# Patient Record
Sex: MALE | Race: WHITE | Employment: OTHER | URBAN - METROPOLITAN AREA
[De-identification: names, ages, dates, MRNs, and addresses within clinical notes are randomized per-mention and may not be internally consistent; named-entity substitution may affect disease eponyms.]

---

## 2019-12-03 ENCOUNTER — HOSPITAL ENCOUNTER (OUTPATIENT)
Dept: PHYSICAL THERAPY | Age: 79
Discharge: HOME | End: 2019-12-03
Payer: MEDICARE

## 2019-12-03 PROCEDURE — 97162 PT EVAL MOD COMPLEX 30 MIN: CPT

## 2019-12-03 PROCEDURE — 97110 THERAPEUTIC EXERCISES: CPT

## 2019-12-03 NOTE — PROGRESS NOTES
[facility]  [address]  [place], [address]  [phone] fax    Plan of Care/ Statement of Necessity for Physical Therapy Services    Patient name: Zina Mccrary Start of Care: 12/3/2019   Referral source: John Russell MD : 1940    Medical Diagnosis: Low back pain [M54.5]  Payor: Sherren Dallas / Plan: [place] MEDICARE PART A & B / Product Type: Medicare /  Onset Date:11/15/19    Treatment Diagnosis: Low back pain [M54.5]   Prior Hospitalization: see medical history Provider#: [id number]   Medications: Verified on Patient summary List    Comorbidities: hx of prostate cancer, thyroid problems, alcohol use, HTN, cancer, (he is currently being tested for kidney disease)   Prior Level of Function:    GISELLA: Pt reports on 11/15/19 he was doing leg presses at the gym when his back and LLE began hurting. The pain is in two separate spots in his leg, in his R hip and his r calf. It is worse with sitting (limited to 3 mins), golfing and doing his typical gym routine (limited 50%). He reports that he did hurt his back 4-5 years ago and did PT here at [facility] which he reports was worse in comparison to how it is now. It did get better with PT back then. It is better with aspirin, he cannot use NSAIDS due to being tested for chronic kidney disease.  Heat, and light activity also help    PAIN:  Location- low back, RLE in hip and calf  Current- 2/10  Best- 2/10  Worst- 5/10  Alleviating factors:heat, light movement, aspirin  Aggravating factors: sitting, golfing, doing gym routine       OBJECTIVE:    MMT:  Hip   -flex L=4/5 R=4/5  -ext L=4/5 R=4/5  -abd L=4/5 R=4/5  Knee  -flex L=4/5 R=4/5  -ext L=5/5 R=5/5  Ankle  - DF L=4/5 R=4/5    Sensation: light touch grossly intact, pt reports he has a hx of some numbness in his L foot he reports is longstanding and due to statin medicaiton    Posture: mild lumbar flattening    Balance: SLS  R= 30s mild pain  L=20 s    AROM:  Lumbar  Flexion- fingertips to tibial tuberosity, SEVERE pain  Extension- WNL   Rotation R-  WNL mild pain at endrange  Rotation L- WNL mild pain at endrange  Sidebend R-WNL   Sidebend L-WNL   Hip  Flexion- L=50 deg R=45 deg with pain  Extension- L=5/20 deg, R=0/20 deg    Outcome Measure: FOTO= 70    Functional movement. Full squat: pt demonstrates pelvic wink, lumbar inc flexion, NEVILLE heel lift compensations for dec hip and ankle mobilty    Palpation for Tenderness: mild tenderness to palpation of R lumbar multifidus    Special Tests:  Supine to long sit- negative  SLR- positive on R  FABERs- negative  Slump Test- positive on R        The Plan of Care and following information is based on the information from the initial evaluation. Assessment/ key information:   Pt presents primarily with signs and symptoms consistent with dx including limited ROM, dec strength, pain, and dec postural dynamic stability which are impairing their ability to functionally sit, golf, and perform typical gym routine. Skilled PT is indicated to address said deficits to return Pt to PLOF safely. Pt progress may be slowed due to Pt age, chronicity of condition, degenerative joint changes, and Pt comorbidities. Physical therapy treatments may include, but are not limited to manual therapy, strengthening, stretching, HEP training, neuromuscular reeducation, balance training, modalities as indicated, postural training, self care training, and functional mobility training. If Pt is not seen within 30 days, Pt will be DC from skilled PT and remain under care of MD.     Evaluation Complexity History HIGH Complexity :3+ comorbidities / personal factors will impact the outcome/ POC ; Examination MEDIUM Complexity : 3 Standardized tests and measures addressing body structure, function, activity limitation and / or participation in recreation  ;Presentation MEDIUM Complexity : Evolving with changing characteristics  ; Clinical Decision Making MEDIUM Complexity : FOTO score of 26-74  Overall Complexity Rating: MEDIUM  Problem List: pain affecting function, decrease ROM, decrease strength, impaired gait/ balance, decrease ADL/ functional abilitiies, decrease activity tolerance, decrease flexibility/ joint mobility and decrease transfer abilities   Treatment Plan may include any combination of the following: Therapeutic exercise, Therapeutic activities, Neuromuscular re-education, Physical agent/modality, Gait/balance training, Manual therapy, Patient education, Self Care training, Functional mobility training, Home safety training and Stair training  Patient / Family readiness to learn indicated by: asking questions  Persons(s) to be included in education: patient (P)  Barriers to Learning/Limitations: yes;  sensory deficits-vision/hearing/speech  Patient Goal (s): flexibility, no pain  Patient Self Reported Health Status: good  Rehabilitation Potential: good    Short Term Goals: To be accomplished in 1 weeks:  1) Goal: Patient will be independent and compliant with HEP in order to progress toward long term goals. Status at last note/certification: issued and reviewed  Long Term Goals: To be accomplished in 10 treatments:  1) Goal: Patient will improve FOTO assessment score to 82 pts in order to indicate improved functional abilities. Status at last note/certification: 70 pts  2) Goal: Patient will improve NEVILLE hip extension to 20 deg for improved posture and gait mechanics  Status at last note/certification:L=5/20, R 0 /20 deg  3) Goal: Patient will report worst back pain as 4/10 or less in order to progress toward personal goals. Status at last note/certification: 79/01  4) Goal: Patient will report overall at least 85% improvement in function in order to progress toward premorbid status.   Status at last note/certification: n/a  5) Goal: Patient will report ability to golf and do gym routine at 100% ability for general health  Status at last note/certification: Pt reports ability to perform 50% his typical activities      Frequency / Duration: Patient to be seen 2 times per week for 5 weeks. Certification Period: 12/3/19-1/27/19  Patient/ Caregiver education and instruction: Diagnosis, prognosis, exercises   [x]  Plan of care has been reviewed with ALIS Owens 12/3/2019 8:12 AM    ________________________________________________________________________    I certify that the above Therapy Services are being furnished while the patient is under my care. I agree with the treatment plan and certify that this therapy is necessary.     Physician's Signature:____________Date:_________TIME:________    ** Signature, Date and Time must be completed for valid certification **    Please sign and return to [facility] [address]  [address]  [place], [address]  [phone] fax

## 2019-12-03 NOTE — PROGRESS NOTES
PT DAILY TREATMENT NOTE 10-18    Patient Name: Cari Salvador  Date:12/3/2019  : 1940  [x]  Patient  Verified  Payor: VA MEDICARE / Plan: VA MEDICARE PART A & B / Product Type: Medicare /    In time:830  Out time:925  Total Treatment Time (min): 55  Visit #: 1 of 10    Medicare/BCBS Only   Total Timed Codes (min):  15 1:1 Treatment Time:  60       Treatment Area: Low back pain [M54.5]    SUBJECTIVE  Pain Level (0-10 scale): 2  Any medication changes, allergies to medications, adverse drug reactions, diagnosis change, or new procedure performed?: [x] No    [] Yes (see summary sheet for update)  Subjective functional status/changes:   [] No changes reported  Pt initial eval today see note for details    OBJECTIVE      40 min [x]Eval                  []Re-Eval       15 min Therapeutic Exercise:  [] See flow sheet :   Rationale: increase ROM and increase strength to improve the patients ability to return to recreational activities              With   [] TE   [] TA   [] neuro   [] other: Patient Education: [x] Review HEP    [] Progressed/Changed HEP based on:   [] positioning   [] body mechanics   [] transfers   [] heat/ice application    [] other:      Other Objective/Functional Measures: Justification for Eval Code Complexity:  Patient History : see POC   Examination see exam   Clinical Presentation: evolving  Clinical Decision Making : FOTO : 70/100       Pain Level (0-10 scale) post treatment: 2    ASSESSMENT/Changes in Function: Pt was instructed in initial HEP required demo, vc, and tc for all exercises to perform correctly. Pt was given hand out detailing exercises and instructed in modification of exercises to tolerance, and in performing exercises safely. Pt verbalized understanding.      Patient will continue to benefit from skilled PT services to modify and progress therapeutic interventions, address functional mobility deficits, address ROM deficits, address strength deficits, analyze and address soft tissue restrictions, analyze and cue movement patterns, analyze and modify body mechanics/ergonomics, assess and modify postural abnormalities, address imbalance/dizziness and instruct in home and community integration to attain remaining goals.      [x]  See Plan of Care  []  See progress note/recertification  []  See Discharge Summary         Progress towards goals / Updated goals:  No progress as goals were set today    PLAN  []  Upgrade activities as tolerated     []  Continue plan of care  []  Update interventions per flow sheet       []  Discharge due to:_  []  Other:_      Carito Bradford 12/3/2019  8:15 AM    Future Appointments   Date Time Provider Juarez Loco   12/3/2019  8:30 AM Nolberto Carvalho [facility]

## 2019-12-05 ENCOUNTER — HOSPITAL ENCOUNTER (OUTPATIENT)
Dept: PHYSICAL THERAPY | Age: 79
Discharge: HOME | End: 2019-12-05
Payer: MEDICARE

## 2019-12-05 PROCEDURE — 97140 MANUAL THERAPY 1/> REGIONS: CPT

## 2019-12-05 PROCEDURE — 97110 THERAPEUTIC EXERCISES: CPT

## 2019-12-05 NOTE — PROGRESS NOTES
PT DAILY TREATMENT NOTE 10-18    Patient Name: Rebecca Samuels  Date:2019  : 1940  [x]  Patient  Verified  Payor: VA MEDICARE / Plan: VA MEDICARE PART A & B / Product Type: Medicare /    In time:900  Out time:930  Total Treatment Time (min): 30   Visit #: 2 of 10    Medicare/BCBS Only   Total Timed Codes (min):  23 1:1 Treatment Time:  23       Treatment Area: Low back pain [M54.5]    SUBJECTIVE  Pain Level (0-10 scale): 2  Any medication changes, allergies to medications, adverse drug reactions, diagnosis change, or new procedure performed?: [x] No    [] Yes (see summary sheet for update)  Subjective functional status/changes:   [] No changes reported  Pt reports he has been doing his stretches at home and they are going well. OBJECTIVE      15 min Therapeutic Exercise:  [x] See flow sheet :   Rationale: increase ROM and increase strength to improve the patients ability to participate in recreational activities    8 min Manual Therapy:  Trigger point release to R TFL   Rationale: increase tissue extensibility to improve ROM for recreational activities. With   [] TE   [] TA   [] neuro   [] other: Patient Education: [x] Review HEP    [] Progressed/Changed HEP based on:   [] positioning   [] body mechanics   [] transfers   [] heat/ice application    [] other:      Other Objective/Functional Measures: initiated therex per flow sheet     Pain Level (0-10 scale) post treatment: 2    ASSESSMENT/Changes in Function: Pt required vc and demo for all new exercises to perform correctly. Pt was challenged with paloff presses, PT plans to add in proprioception training NV to improve Pt balance and postural awareness with either eyes closed on foam, or eyes open foam chops to improve core stability and balance. Pt had no inc in sx with exercise or manual tx today. Pt edu re ankle mobility and proprioception having an influence on balance. Pt verbalized understanding.  Pt had 23 mins 1:1 time 8 mins manual therapy, 15 mins therex with PT, 7 mins with tech for 22 mins total    Patient will continue to benefit from skilled PT services to modify and progress therapeutic interventions, address functional mobility deficits, address ROM deficits, address strength deficits, analyze and address soft tissue restrictions, analyze and cue movement patterns, analyze and modify body mechanics/ergonomics, assess and modify postural abnormalities, address imbalance/dizziness and instruct in home and community integration to attain remaining goals. []  See Plan of Care  []  See progress note/recertification  []  See Discharge Summary         Progress towards goals / Updated goals:  Short Term Goals: To be accomplished in 1 weeks:  1) Goal: Patient will be independent and compliant with HEP in order to progress toward long term goals. Status at last note/certification: issued and reviewed  [facility] [address] be accomplished in 10 treatments:  1) Goal: Patient will improve FOTO assessment score to 82 pts in order to indicate improved functional abilities. Status at last note/certification: 70 pts  2) Goal: Patient will improve NEVILLE hip extension to 20 deg for improved posture and gait mechanics  Status at last note/certification:L=5/20, R 0 /20 deg  3) Goal: Patient will report worst back pain as 4/10 or less in order to progress toward personal goals. Status at last note/certification: 29/08  4) Goal: Patient will report overall at least 85% improvement in function in order to progress toward premorbid status.   Status at last note/certification: n/a  5) Goal: Patient will report ability to golf and do gym routine at 100% ability for general health  Status at last note/certification: Pt reports ability to perform 50% his typical activities       PLAN  [x]  Upgrade activities as tolerated     []  Continue plan of care  []  Update interventions per flow sheet       []  Discharge due to:_  []  Other:_      Aby Campos Velo 12/5/2019  10:25 AM    Future Appointments   Date Time Provider Juarez Loco   12/10/2019  9:00 AM Sharon Brush, PTA [facility]   12/12/2019  9:30 AM Sharon Brush, PTA [facility]   12/17/2019  9:30 AM Sharon Brush, PTA [facility]   12/19/2019  9:00 AM Sharon Brush, PTA [facility]   12/23/2019  9:30 AM Lyla Whittington, PTA [facility]   12/26/2019  9:00 AM Cem Staples, PT [facility]   12/31/2019  9:00 AM Sharon Brush, PTA [facility]   1/2/2020  9:00 AM Cem Staples, PT [facility]

## 2019-12-10 ENCOUNTER — HOSPITAL ENCOUNTER (OUTPATIENT)
Dept: PHYSICAL THERAPY | Age: 79
Discharge: HOME | End: 2019-12-10
Payer: MEDICARE

## 2019-12-10 PROCEDURE — 97110 THERAPEUTIC EXERCISES: CPT

## 2019-12-10 PROCEDURE — 97140 MANUAL THERAPY 1/> REGIONS: CPT

## 2019-12-10 NOTE — PROGRESS NOTES
PT DAILY TREATMENT NOTE 10-18    Patient Name: Oralia Calderon  Date:12/10/2019  : 1940  [x]  Patient  Verified  Payor: VA MEDICARE / Plan: VA MEDICARE PART A & B / Product Type: Medicare /    In time:9:00  Out time:9:39  Total Treatment Time (min): 39  Visit #: 3 of 10    Medicare/BCBS Only   Total Timed Codes (min):  39 1:1 Treatment Time:  31       Treatment Area: Low back pain [M54.5]    SUBJECTIVE  Pain Level (0-10 scale): 3/10  Any medication changes, allergies to medications, adverse drug reactions, diagnosis change, or new procedure performed?: [x] No    [] Yes (see summary sheet for update)  Subjective functional status/changes:   [] No changes reported  \"It's really bad today, I can't sit long at all, it was a 7/10 driving here this morning. \"    OBJECTIVE    31 min Therapeutic Exercise:  [x] See flow sheet :   Rationale: increase ROM, increase strength and increase proprioception to improve the patients ability to perform ADL's.    8 min Manual Therapy:  DTM/TPR right QL, L/S paraspinals, piriformis. Pt prone. Rationale: decrease pain, increase ROM, increase tissue extensibility and decrease trigger points to improve ease of performing functional activities. With   [x] TE   [] TA   [] neuro   [] other: Patient Education: [x] Review HEP    [] Progressed/Changed HEP based on:   [] positioning   [] body mechanics   [] transfers   [] heat/ice application    [] other:      Other Objective/Functional Measures: Able to perform standing trunk extension 10x3\", pt stated \"this feels good. \" Added active HS stretch (B) and piriformis stretch (B) per flow sheet. Pain Level (0-10 scale) post treatment: 2-3/10    ASSESSMENT/Changes in Function: Significant restrictions in Right piriformis. Pt expressed Right calf pain increases while in seated position. Slight decrease in pain level post-treatment.  Continue PT to increase flexibility, decrease mm restrictions, improve posture to improve ease of performing functional activities. Patient will continue to benefit from skilled PT services to modify and progress therapeutic interventions, address functional mobility deficits, address ROM deficits, address strength deficits, analyze and address soft tissue restrictions and analyze and modify body mechanics/ergonomics to attain remaining goals. [x]  See Plan of Care  []  See progress note/recertification  []  See Discharge Summary         Progress towards goals / Updated goals:  Short Term Goals: To be accomplished in 1 weeks:  1) Goal: Patient will be independent and compliant with HEP in order to progress toward long term goals. Status at last note/certification: issued and reviewed  Current: Met, pt reports HEP compliance. 12/10/2019  Long Term Goals: To be accomplished in 10 treatments:  1) Goal: Patient will improve FOTO assessment score to 82 pts in order to indicate improved functional abilities. Status at last note/certification: 37 XOE  2) Goal: Patient will improve NEVILLE hip extension to 20 deg for improved posture and gait mechanics  Status at last note/certification:L=5/20, R 0 /20 deg  3) Goal: Patient will report worst back pain as 4/10 or less in order to progress toward personal goals. Status at last note/certification: 44/65  4) Goal: Patient will report overall at least 85% improvement in function in order to progress toward premorbid status.   Status at last note/certification: n/a  5) Goal: Patient will report ability to golf and do gym routine at 100% ability for general health  Status at last note/certification: Pt reports ability to perform 50% his typical activities    PLAN  []  Upgrade activities as tolerated     [x]  Continue plan of care  []  Update interventions per flow sheet       []  Discharge due to:_  []  Other:_      Gutierrez Fung PTA 12/10/2019  8:35 AM    Future Appointments   Date Time Provider Juarez Loco   12/10/2019  9:00 AM Carlene Linares PTA [facility]   12/12/2019  9:30 AM Jonatan Harvel, PTA [facility]   12/17/2019  9:30 AM Jonatan Harvel, PTA [facility]   12/19/2019  9:00 AM Jonatan Harvel, PTA [facility]   12/23/2019  9:30 AM Alison Lainez, PTA [facility]   12/26/2019  9:00 AM Vernal Ket, PT [facility]   12/31/2019  9:00 AM Jonatan Harvel, PTA [facility]   1/2/2020  9:00 AM Verbarron Ket, PT [facility]

## 2019-12-12 ENCOUNTER — HOSPITAL ENCOUNTER (OUTPATIENT)
Dept: PHYSICAL THERAPY | Age: 79
Discharge: HOME | End: 2019-12-12
Payer: MEDICARE

## 2019-12-12 PROCEDURE — 97110 THERAPEUTIC EXERCISES: CPT

## 2019-12-12 PROCEDURE — 97140 MANUAL THERAPY 1/> REGIONS: CPT

## 2019-12-12 NOTE — PROGRESS NOTES
PT DAILY TREATMENT NOTE 10-18    Patient Name: Chas Ritter  Date:2019  : 1940  [x]  Patient  Verified  Payor: Nancy Dorman / Plan: VA MEDICARE PART A & B / Product Type: Medicare /    In IOXA:6682  Out time:1005  Total Treatment Time (min): 47  Visit #: 4 of 10    Medicare/BCBS Only   Total Timed Codes (min):  47 1:1 Treatment Time:  42       Treatment Area: Low back pain [M54.5]    SUBJECTIVE  Pain Level (0-10 scale): 3  Any medication changes, allergies to medications, adverse drug reactions, diagnosis change, or new procedure performed?: [x] No    [] Yes (see summary sheet for update)  Subjective functional status/changes:   [] No changes reported  Pt reports he is most comfortable with supine position. OBJECTIVE    Modality rationale:    Min Type Additional Details    [] Estim:  []Unatt       []IFC  []Premod                        []Other:  []w/ice   []w/heat  Position:  Location:    [] Estim: []Att    []TENS instruct  []NMES                    []Other:  []w/US   []w/ice   []w/heat  Position:  Location:    []  Traction: [] Cervical       []Lumbar                       [] Prone          []Supine                       []Intermittent   []Continuous Lbs:  [] before manual  [] after manual    []  Ultrasound: []Continuous   [] Pulsed                           []1MHz   []3MHz W/cm2:  Location:    []  Iontophoresis with dexamethasone         Location: [] Take home patch   [] In clinic    []  Ice     []  heat  []  Ice massage  []  Laser   []  Anodyne Position:  Location:    []  Laser with stim  []  Other:  Position:  Location:    []  Vasopneumatic Device Pressure:       [] lo [] med [] hi   Temperature: [] lo [] med [] hi   [] Skin assessment post-treatment:  []intact []redness- no adverse reaction    []redness  adverse reaction:     39 min Therapeutic Exercise:  [x] See flow sheet :   Rationale: increase ROM and increase strength to improve the patients ability to perform functional tasks.      8 min Manual Therapy:  STM right  glutes, piriformis, TrP release glute max and piriformis   Rationale: decrease pain, increase ROM and increase tissue extensibility to improve abl            With   [] TE   [] TA   [] neuro   [] other: Patient Education: [x] Review HEP    [] Progressed/Changed HEP based on:   [] positioning   [] body mechanics   [] transfers   [] heat/ice application    [] other:      Other Objective/Functional Measures: Added right QL stretch in sidelying     Pain Level (0-10 scale) post treatment: 2    ASSESSMENT/Changes in Function: pt reported improved tolerance to sitting and diminished pain following treatment. He also reported no c/o gastroc region pain post treatment. Patient will continue to benefit from skilled PT services to modify and progress therapeutic interventions, address functional mobility deficits, address ROM deficits, address strength deficits, analyze and address soft tissue restrictions, analyze and cue movement patterns, analyze and modify body mechanics/ergonomics and assess and modify postural abnormalities to attain remaining goals. []  See Plan of Care  []  See progress note/recertification  []  See Discharge Summary         Progress towards goals / Updated goals:  Short Term Goals: To be accomplished in 1 weeks:  1) Goal: Patient will be independent and compliant with HEP in order to progress toward long term goals. Status at last note/certification: issued and reviewed  Current: Met, pt reports HEP compliance. 12/10/2019  Long Term Goals: To be accomplished in 10 treatments:  1) Goal: Patient will improve FOTO assessment score to 82 pts in order to indicate improved functional abilities.   Status at last note/certification: 72   2) Goal: Patient will improve NEVILLE hip extension to 20 deg for improved posture and gait mechanics  Status at last note/certification:L=5/20, R 0 /20 deg  Current: improving as noted noted with 1/2 prone hip extension 12-12-19  3) Goal: Patient will report worst back pain as 4/10 or less in order to progress toward personal goals. Status at last note/certification: 88/56  Current: improving per pt report on 12-12-19  4) Goal: Patient will report overall at least 85% improvement in function in order to progress toward premorbid status.   Status at last note/certification: n/a  5) Goal: Patient will report ability to golf and do gym routine at 100% ability for general health  Status at last note/certification: Pt reports ability to perform 50% his typical activities       PLAN  []  Upgrade activities as tolerated     [x]  Continue plan of care  []  Update interventions per flow sheet       []  Discharge due to:_  []  Other:_      Ivanna Natarajan, PT 12/12/2019  9:22 AM    Future Appointments   Date Time Provider Juarez Loco   12/12/2019  9:30 AM Srinivas Quezada, PT [facility]   12/17/2019  9:30 AM Twin Bynum, PTA [facility]   12/19/2019  9:00 AM Twin Bynum, PTA [facility]   12/23/2019  9:30 AM Bharathi Del Valle PTA [facility]   12/26/2019  9:00 AM Srinivas Quezada, PT [facility]   12/31/2019  9:00 AM Twin Bynum, PTA [facility]   1/2/2020  9:00 AM Srinivas Quezada, PT [facility]

## 2019-12-17 ENCOUNTER — HOSPITAL ENCOUNTER (OUTPATIENT)
Dept: PHYSICAL THERAPY | Age: 79
Discharge: HOME | End: 2019-12-17
Payer: MEDICARE

## 2019-12-17 PROCEDURE — 97110 THERAPEUTIC EXERCISES: CPT

## 2019-12-17 PROCEDURE — 97140 MANUAL THERAPY 1/> REGIONS: CPT

## 2019-12-17 NOTE — PROGRESS NOTES
PT DAILY TREATMENT NOTE 10-18    Patient Name: Gui Valdez  Date:2019  : 1940  [x]  Patient  Verified  Payor: [place] MEDICARE / Plan: [place] MEDICARE PART A & B / Product Type: Medicare /    In time:9:27  Out time:10:06  Total Treatment Time (min): 39  Visit #: 5 of 10    Medicare/BCBS Only   Total Timed Codes (min):  39 1:1 Treatment Time:  39       Treatment Area: Low back pain [M54.5]    SUBJECTIVE  Pain Level (0-10 scale): 2/10  Any medication changes, allergies to medications, adverse drug reactions, diagnosis change, or new procedure performed?: [x] No    [] Yes (see summary sheet for update)  Subjective functional status/changes:   [] No changes reported  \"Having a little pain in the right calf. \"    OBJECTIVE    31 min Therapeutic Exercise:  [x] See flow sheet :   Rationale: increase ROM, increase strength and increase proprioception to improve the patients ability to perform ADL's.     8 min Manual Therapy:  DTM/TPR Right QL, L/S paraspinals, piriformis and gluteals. Pt prone. Rationale: decrease pain, increase ROM, increase tissue extensibility and decrease trigger points to improve ease of performing functional and recreational activities. With   [x] TE   [] TA   [] neuro   [] other: Patient Education: [x] Review HEP    [] Progressed/Changed HEP based on:   [] positioning   [] body mechanics   [] transfers   [] heat/ice application    [] other:      Other Objective/Functional Measures: Added stationary bike as warm up, level 4 resistance. Pain Level (0-10 scale) post treatment: 1/10    ASSESSMENT/Changes in Function: Pt reports improvement with performing ADL's, \"driving is a lot easier. \" Pt fully participated in treatment, reported a slight decrease in radicular symptoms post-treatment. Continue PT to increase mobility/flexibility and increase core stab to improve ease of performing functional activities.     Patient will continue to benefit from skilled PT services to modify and progress therapeutic interventions, address functional mobility deficits, address ROM deficits, address strength deficits, analyze and address soft tissue restrictions and analyze and modify body mechanics/ergonomics to attain remaining goals. [x]  See Plan of Care  []  See progress note/recertification  []  See Discharge Summary         Progress towards goals / Updated goals:  Short Term Goals: To be accomplished in 1 weeks:  1) Goal: Patient will be independent and compliant with HEP in order to progress toward long term goals. Status at last note/certification: issued and reviewed  Current: Met, pt reports HEP compliance. 12/10/2019  Long Term Goals: To be accomplished in 10 treatments:  1) Goal: Patient will improve FOTO assessment score to 82 pts in order to indicate improved functional abilities. Status at last note/certification: 87 ZQV  2) Goal: Patient will improve NEVILLE hip extension to 20 deg for improved posture and gait mechanics  Status at last note/certification:L=5/20, R 0 /20 deg  Current: improving as noted noted with 1/2 prone hip extension 12-12-19  3) Goal: Patient will report worst back pain as 4/10 or less in order to progress toward personal goals. Status at last note/certification: 79/51  Current: improving per pt report on 12-12-19  4) Goal: Patient will report overall at least 85% improvement in function in order to progress toward premorbid status. Status at last note/certification: n/a  Current: Progressing, pt reports improvement with performing ADL's, \"driving is a lot easier. \" 12/17/2019  5) Goal: Patient will report ability to golf and do gym routine at 100% ability for general health  Status at last note/certification: Pt reports ability to perform 50% his typical activities    PLAN  []  Upgrade activities as tolerated     [x]  Continue plan of care   []  Update interventions per flow sheet       []  Discharge due to:_  []  Other:_      Carolina Browne PTA 12/17/2019  9:07 AM    Future Appointments   Date Time Provider Juarez Beronica   12/17/2019  9:30 AM Jada Wolf, PTA [facility]   12/19/2019  9:00 AM Jada Wolf, PTA [facility]   12/23/2019  9:30 AM Felipe Coe, PTA [facility]   12/26/2019  9:00 AM Delicia Pepe, PT [facility]   12/31/2019  9:00 AM Jada Wolf, PTA [facility]   1/2/2020  9:00 AM Delicia Pepe, PT [facility]

## 2019-12-19 ENCOUNTER — HOSPITAL ENCOUNTER (OUTPATIENT)
Dept: PHYSICAL THERAPY | Age: 79
Discharge: HOME | End: 2019-12-19
Payer: MEDICARE

## 2019-12-19 PROCEDURE — 97112 NEUROMUSCULAR REEDUCATION: CPT

## 2019-12-19 PROCEDURE — 97140 MANUAL THERAPY 1/> REGIONS: CPT

## 2019-12-19 PROCEDURE — 97110 THERAPEUTIC EXERCISES: CPT

## 2019-12-19 NOTE — PROGRESS NOTES
PT DAILY TREATMENT NOTE 10-18    Patient Name: Alycia Guerra  Date:2019  : 1940  [x]  Patient  Verified  Payor: [place] MEDICARE / Plan: [place] MEDICARE PART A & B / Product Type: Medicare /    In time:9:00  Out time:9:57  Total Treatment Time (min): 57  Visit #: 6 of 10    Medicare/BCBS Only   Total Timed Codes (min):  57 1:1 Treatment Time:  48       Treatment Area: Low back pain [M54.5]    SUBJECTIVE  Pain Level (0-10 scale): 0/10  Any medication changes, allergies to medications, adverse drug reactions, diagnosis change, or new procedure performed?: [x] No    [] Yes (see summary sheet for update)  Subjective functional status/changes:   [] No changes reported  \"No pain at the moment. \"    OBJECTIVE    39 min Therapeutic Exercise:  [x] See flow sheet :   Rationale: increase ROM and increase strength to improve the patients ability to perform ADL's. 10 min Neuromuscular Re-education:  [x]  See flow sheet : Core align, ness series for posture and core work. Rationale: increase strength and increase proprioception  to improve the patients ability to perform functional activities. 8 min Manual Therapy:  DTM/TPR Right QL, L/S paraspinals, piriformis and gluteals. Pt prone. Rationale: decrease pain, increase ROM, increase tissue extensibility and decrease trigger points to improve ease of performing functional activities. With   [x] TE   [] TA   [] neuro   [] other: Patient Education: [x] Review HEP    [] Progressed/Changed HEP based on:   [] positioning   [] body mechanics   [] transfers   [] heat/ice application    [] other:      Other Objective/Functional Measures: Added ness chops, coreAlign statue series. Pain Level (0-10 scale) post treatment: 0/10    ASSESSMENT/Changes in Function: Challenged with coreAlign exercises. Expressed mm fatigue and soreness, denied pain. Continue PT to increase core and (B) hip strength to improve ease of performing functional tasks.     Patient will continue to benefit from skilled PT services to modify and progress therapeutic interventions, address functional mobility deficits, address ROM deficits, address strength deficits, analyze and address soft tissue restrictions and analyze and modify body mechanics/ergonomics to attain remaining goals. [x]  See Plan of Care  []  See progress note/recertification  []  See Discharge Summary         Progress towards goals / Updated goals:  Short Term Goals: To be accomplished in 1 weeks:  1) Goal: Patient will be independent and compliant with HEP in order to progress toward long term goals. Status at last note/certification: issued and reviewed  Current: Met, pt reports HEP compliance. 12/10/2019  Long Term Goals: To be accomplished in 10 treatments:  1) Goal: Patient will improve FOTO assessment score to 82 pts in order to indicate improved functional abilities. Status at last note/certification: 97 CMY  2) Goal: Patient will improve NEVILLE hip extension to 20 deg for improved posture and gait mechanics  Status at last note/certification:L=5/20, R 0 /20 deg  Current: improving as noted noted with 1/2 prone hip extension 12-12-19  3) Goal: Patient will report worst back pain as 4/10 or less in order to progress toward personal goals. Status at last note/certification: 84/09  Current: improving per pt report on 12-12-19  4) Goal: Patient will report overall at least 85% improvement in function in order to progress toward premorbid status. Status at last note/certification: n/a  Current: Progressing, pt reports improvement with performing ADL's, \"driving is a lot easier. \" 12/17/2019  5) Goal: Patient will report ability to golf and do gym routine at 100% ability for general health  Status at last note/certification: Pt reports ability to perform 50% his typical activities    PLAN  []  Upgrade activities as tolerated     [x]  Continue plan of care  []  Update interventions per flow sheet       []  Discharge due to:_  []  Other:_      Carolina Browne, ALIS 12/19/2019  8:47 AM    Future Appointments   Date Time Provider Juarez Loco   12/19/2019  9:00 AM [facility]   12/23/2019  9:30 AM Wendy Fisher PTA [facility]   12/26/2019  9:00 AM [facility]   12/31/2019  9:00 AM Jennifer Mcclelland PTA [facility]   1/2/2020  9:00 AM Santa Moreno, PT [facility]

## 2019-12-23 ENCOUNTER — HOSPITAL ENCOUNTER (OUTPATIENT)
Dept: PHYSICAL THERAPY | Age: 79
Discharge: HOME | End: 2019-12-23
Payer: MEDICARE

## 2019-12-23 PROCEDURE — 97140 MANUAL THERAPY 1/> REGIONS: CPT

## 2019-12-23 PROCEDURE — 97110 THERAPEUTIC EXERCISES: CPT

## 2019-12-23 PROCEDURE — 97112 NEUROMUSCULAR REEDUCATION: CPT

## 2019-12-23 NOTE — PROGRESS NOTES
PT DAILY TREATMENT NOTE 10-18    Patient Name: Adeola Wagner  Date:2019  : 1940  [x]  Patient  Verified  Payor: [place] MEDICARE / Plan: [place] MEDICARE PART A & B / Product Type: Medicare /    In time:9:23  Out time:9:07  Total Treatment Time (min): 44  Visit #: 7 of 10    Medicare/BCBS Only   Total Timed Codes (min):  44 1:1 Treatment Time:  44       Treatment Area: Low back pain [M54.5]    SUBJECTIVE  Pain Level (0-10 scale): 1  Any medication changes, allergies to medications, adverse drug reactions, diagnosis change, or new procedure performed?: [x] No    [] Yes (see summary sheet for update)  Subjective functional status/changes:   [] No changes reported  Pt reports pain has started going into left lower leg, not just the right    OBJECTIVE    26 min Therapeutic Exercise:  [x] See flow sheet :   Rationale: increase ROM and increase strength to improve the patients ability to perform ADLs    10 min Neuromuscular Re-education:  [x]  See flow sheet :   Rationale: increase strength, improve balance and increase proprioception  to improve the patients ability to perform functional tasks    8 min Manual Therapy:   DTM/TPR Right QL, L/S paraspinals, piriformis and gluteals. Pt prone. Rationale: decrease pain, increase ROM and increase tissue extensibility to improve functional mobility          With   [] TE   [] TA   [] neuro   [] other: Patient Education: [x] Review HEP    [] Progressed/Changed HEP based on:   [] positioning   [] body mechanics   [] transfers   [] heat/ice application    [] other:      Other Objective/Functional Measures:   Performed Q/L str @ stairs   Added Ludivina SLS with 10# pull down    Pain Level (0-10 scale) post treatment: 0    ASSESSMENT/Changes in Function: Pt challenged well with SLS pull down, vc's for ta activation. Reports incr'd ease with HS, Piri, and hip flexor stretches. Also reports decr'd TTP with DTM to L/s paraspinals and piri. No pain following treatment today. Patient will continue to benefit from skilled PT services to modify and progress therapeutic interventions, address functional mobility deficits, address ROM deficits, address strength deficits, analyze and address soft tissue restrictions, analyze and cue movement patterns, analyze and modify body mechanics/ergonomics and assess and modify postural abnormalities to attain remaining goals. []  See Plan of Care  []  See progress note/recertification  []  See Discharge Summary         Progress towards goals / Updated goals:  Short Term Goals: To be accomplished in 1 weeks:  1) Goal: Patient will be independent and compliant with HEP in order to progress toward long term goals. Status at last note/certification: issued and reviewed  Current: Met, pt reports HEP compliance. 12/10/2019  Long Term Goals: To be accomplished in 10 treatments:  1) Goal: Patient will improve FOTO assessment score to 82 pts in order to indicate improved functional abilities. Status at last note/certification: 14 GXG  2) Goal: Patient will improve NEVILLE hip extension to 20 deg for improved posture and gait mechanics  Status at last note/certification:L=5/20, R 0 /20 deg  Current: improving as noted noted with 1/2 prone hip extension 12-12-19  3) Goal: Patient will report worst back pain as 4/10 or less in order to progress toward personal goals. Status at last note/certification: 91/43  Current: improving per pt report on 12-12-19  4) Goal: Patient will report overall at least 85% improvement in function in order to progress toward premorbid status. Status at last note/certification: n/a  Current: Progressing, pt reports improvement with performing ADL's, \"driving is a lot easier. \" 12/17/2019  5) Goal: Patient will report ability to golf and do gym routine at 100% ability for general health  Status at last note/certification: Pt reports ability to perform 50% his typical activities       PLAN  []  Upgrade activities as tolerated [x]  Continue plan of care  []  Update interventions per flow sheet       []  Discharge due to:_  []  Other:_      Sheyla Fisher PTA 12/23/2019  9:25 AM    Future Appointments   Date Time Provider Juarez Loco   12/23/2019  9:30 AM [facility]   12/26/2019  9:00 AM Santa Moreno, PT [facility]   12/31/2019  9:00 AM [facility]   1/2/2020  9:00 AM [facility]

## 2019-12-26 ENCOUNTER — HOSPITAL ENCOUNTER (OUTPATIENT)
Dept: PHYSICAL THERAPY | Age: 79
Discharge: HOME | End: 2019-12-26
Payer: MEDICARE

## 2019-12-26 PROCEDURE — 97110 THERAPEUTIC EXERCISES: CPT

## 2019-12-26 PROCEDURE — 97140 MANUAL THERAPY 1/> REGIONS: CPT

## 2019-12-26 NOTE — PROGRESS NOTES
PT DAILY TREATMENT NOTE 10-18    Patient Name: Yves Monet  Date:2019  : 1940  [x]  Patient  Verified  Payor: [place] MEDICARE / Plan: [place] MEDICARE PART A & B / Product Type: Medicare /    In time:0900  Out time:942  Total Treatment Time (min): 42  Visit #: 7 of 10    Medicare/BCBS Only   Total Timed Codes (min):  42 1:1 Treatment Time:  25       Treatment Area: Low back pain [M54.5]    SUBJECTIVE  Pain Level (0-10 scale): 1  Any medication changes, allergies to medications, adverse drug reactions, diagnosis change, or new procedure performed?: [x] No    [] Yes (see summary sheet for update)  Subjective functional status/changes:   [] No changes reported  Pt reports doing much better since starting PT    OBJECTIVE    Modality rationale:    Min Type Additional Details    [] Estim:  []Unatt       []IFC  []Premod                        []Other:  []w/ice   []w/heat  Position:  Location:    [] Estim: []Att    []TENS instruct  []NMES                    []Other:  []w/US   []w/ice   []w/heat  Position:  Location:    []  Traction: [] Cervical       []Lumbar                       [] Prone          []Supine                       []Intermittent   []Continuous Lbs:  [] before manual  [] after manual    []  Ultrasound: []Continuous   [] Pulsed                           []1MHz   []3MHz W/cm2:  Location:    []  Iontophoresis with dexamethasone         Location: [] Take home patch   [] In clinic    []  Ice     []  heat  []  Ice massage  []  Laser   []  Anodyne Position:  Location:    []  Laser with stim  []  Other:  Position:  Location:    []  Vasopneumatic Device Pressure:       [] lo [] med [] hi   Temperature: [] lo [] med [] hi   [] Skin assessment post-treatment:  []intact []redness- no adverse reaction    26 min Therapeutic Exercise:  [x]? See flow sheet :   Rationale: increase ROM and increase strength to improve the patients ability to perform ADL's.     8 min Neuromuscular Re-education:  [x]?   See flow sheet : Core align, ness series for posture and core work. Rationale: increase strength and increase proprioception  to improve the patients ability to perform functional activities.     8 min Manual Therapy:  DTM/TPR right piriformis and gluteals. Rationale: decrease pain, increase ROM, increase tissue extensibility and decrease trigger points to improve ease of performing functional activities. With   [] TE   [] TA   [] neuro   [] other: Patient Education: [x] Review HEP    [] Progressed/Changed HEP based on:   [] positioning   [] body mechanics   [] transfers   [] heat/ice application    [] other:      Other Objective/Functional Measures:  Improved myofascial restrictions     Pain Level (0-10 scale) post treatment: 0    ASSESSMENT/Changes in Function: pt is progressing well with PT. He demonstrates significant improvement with pain and function. Patient will continue to benefit from skilled PT services to modify and progress therapeutic interventions, address functional mobility deficits, address ROM deficits, address strength deficits, analyze and address soft tissue restrictions, analyze and cue movement patterns and analyze and modify body mechanics/ergonomics to attain remaining goals. []  See Plan of Care  []  See progress note/recertification  []  See Discharge Summary         Progress towards goals / Updated goals:  Short Term Goals: To be accomplished in 1 weeks:  1) Goal: Patient will be independent and compliant with HEP in order to progress toward long term goals. Status at last note/certification: issued and reviewed  Current: Met, pt reports HEP compliance. 12/10/2019    Long Term Goals: To be accomplished in 10 treatments:  1) Goal: Patient will improve FOTO assessment score to 82 pts in order to indicate improved functional abilities.   Status at last note/certification: 13 AWK  2) Goal: Patient will improve NEVILLE hip extension to 20 deg for improved posture and gait mechanics  Status at last note/certification:L=5/20, R 0 /20 deg  Current: improving as noted noted with 1/2 prone hip extension 12-12-19  3) Goal: Patient will report worst back pain as 4/10 or less in order to progress toward personal goals. Status at last note/certification: 47/88  Current: MET on 12-26-19  4) Goal: Patient will report overall at least 85% improvement in function in order to progress toward premorbid status. Status at last note/certification: n/a  Current: Progressing, pt reports improvement with performing ADL's, \"driving is a lot easier. \" 12/17/2019  5) Goal: Patient will report ability to golf and do gym routine at 100% ability for general health  Status at last note/certification: Pt reports ability to perform 50% his typical activities    PLAN  []  Upgrade activities as tolerated     [x]  Continue plan of care  []  Update interventions per flow sheet       []  Discharge due to:_  []  Other:_      Cyrus Cannon PT 12/26/2019  9:32 AM    Future Appointments   Date Time Provider Juarez Loco   12/31/2019  9:00 AM Luis Daniel Jonas, PTA [facility]   1/2/2020  9:00 [facility]

## 2019-12-31 ENCOUNTER — HOSPITAL ENCOUNTER (OUTPATIENT)
Dept: PHYSICAL THERAPY | Age: 79
Discharge: HOME | End: 2019-12-31
Payer: MEDICARE

## 2019-12-31 PROCEDURE — 97112 NEUROMUSCULAR REEDUCATION: CPT

## 2019-12-31 PROCEDURE — 97140 MANUAL THERAPY 1/> REGIONS: CPT

## 2019-12-31 PROCEDURE — 97110 THERAPEUTIC EXERCISES: CPT

## 2019-12-31 NOTE — PROGRESS NOTES
PT DAILY TREATMENT NOTE 10-18    Patient Name: Vipin Mcmillan  Date:2019  : 1940  [x]  Patient  Verified     Payor: [place] MEDICARE / Plan: [place] MEDICARE PART A & B / Product Type: Medicare /    In time:8:50  Out time:9:35  Total Treatment Time (min): 45  Visit #: 9 of 10    Medicare/BCBS Only   Total Timed Codes (min):  45 1:1 Treatment Time:  45       Treatment Area: Low back pain [M54.5]    SUBJECTIVE  Pain Level (0-10 scale): 1-2/10  Any medication changes, allergies to medications, adverse drug reactions, diagnosis change, or new procedure performed?: [x] No    [] Yes (see summary sheet for update)  Subjective functional status/changes:   [] No changes reported  \"I still have a little discomfort, an annoying level, in the right calf. \"    OBJECTIVE    27 min Therapeutic Exercise:  [x] See flow sheet :   Rationale: increase ROM and increase strength to improve the patients ability to perform ADL's. 10 min Neuromuscular Re-education:  [x]  See flow sheet : Ludivina activities, core stabilization. Rationale: increase strength and increase proprioception  to improve the patients ability to perform functional activities. 8 min Manual Therapy:  DTM/TPR Right QL, L/S paraspinals, piriformis and gluteals, with Right hip PROM IR/ER. Pt prone. Rationale: decrease pain, increase ROM, increase tissue extensibility and decrease trigger points to improve ease of performing functional activities. With   [x] TE   [] TA   [] neuro   [] other: Patient Education: [x] Review HEP    [] Progressed/Changed HEP based on:   [] positioning   [] body mechanics   [] transfers   [] heat/ice application    [] other:      Other Objective/Functional Measures:      Pain Level (0-10 scale) post treatment: 0/10    ASSESSMENT/Changes in Function: Pt reports ~80% return to usual golf/gym activities. Pt denied pain post-treatment. Demonstrated improved trunk stability while performing ludivina series.  Continue PT to further decrease mm restrictions, increase core and (B) hip strength to improve ease of performing functional activities. Patient will continue to benefit from skilled PT services to modify and progress therapeutic interventions, address functional mobility deficits, address ROM deficits, address strength deficits, analyze and address soft tissue restrictions and analyze and modify body mechanics/ergonomics to attain remaining goals. [x]  See Plan of Care  []  See progress note/recertification  []  See Discharge Summary         Progress towards goals / Updated goals:  Short Term Goals: To be accomplished in 1 weeks:  1) Goal: Patient will be independent and compliant with HEP in order to progress toward long term goals. Status at last note/certification: issued and reviewed  Current: Met, pt reports HEP compliance. 12/10/2019     Long Term Goals: To be accomplished in 10 treatments:  1) Goal: Patient will improve FOTO assessment score to 82 pts in order to indicate improved functional abilities. Status at last note/certification: 75 TPP  2) Goal: Patient will improve NEVILLE hip extension to 20 deg for improved posture and gait mechanics  Status at last note/certification:L=5/20, R 0 /20 deg  Current: improving as noted noted with 1/2 prone hip extension 12-12-19  3) Goal: Patient will report worst back pain as 4/10 or less in order to progress toward personal goals. Status at last note/certification: 71/68  Current: MET on 12-26-19  4) Goal: Patient will report overall at least 85% improvement in function in order to progress toward premorbid status. Status at last note/certification: n/a  Current: Progressing, pt reports improvement with performing ADL's, \"driving is a lot easier. \" 12/17/2019  5) Goal: Patient will report ability to golf and do gym routine at 100% ability for general health  Status at last note/certification: Pt reports ability to perform 50% his typical activities  Current: Progressing, pt reports ~80% return to usual golf/gym activities.  12/31/2019     PLAN  []  Upgrade activities as tolerated     [x]  Continue plan of care  []  Update interventions per flow sheet       []  Discharge due to:_  []  Other:_      Pavithra Yoder PTA 12/31/2019  8:47 AM    Future Appointments   Date Time Provider Juarez Loco   12/31/2019  9:00 AM [facility]   1/2/2020  9:00 AM [facility]

## 2020-01-02 ENCOUNTER — HOSPITAL ENCOUNTER (OUTPATIENT)
Dept: PHYSICAL THERAPY | Age: 80
Discharge: HOME | End: 2020-01-02
Payer: MEDICARE

## 2020-01-02 PROCEDURE — 97112 NEUROMUSCULAR REEDUCATION: CPT

## 2020-01-02 PROCEDURE — 97140 MANUAL THERAPY 1/> REGIONS: CPT

## 2020-01-02 PROCEDURE — 97110 THERAPEUTIC EXERCISES: CPT

## 2020-01-02 NOTE — PROGRESS NOTES
PT DAILY TREATMENT NOTE 10-18    Patient Name: Meliton Braxton  Date:2020  : 1940  [x]  Patient  Verified  Payor: VA MEDICARE / Plan: VA MEDICARE PART A & B / Product Type: Medicare /    In KCEN:2926  Out time:1000  Total Treatment Time (min): 67  Visit #: 10 of 10    Medicare/BCBS Only   Total Timed Codes (min):  67 1:1 Treatment Time:  55       Treatment Area: Low back pain [M54.5]    SUBJECTIVE  Pain Level (0-10 scale): 1  Any medication changes, allergies to medications, adverse drug reactions, diagnosis change, or new procedure performed?: [x] No    [] Yes (see summary sheet for update)  Subjective functional status/changes:   [] No changes reported  Pt reports much improvement with PT. He reports minimal pain and feels he is ready for discharge.      OBJECTIVE    Modality rationale:    Min Type Additional Details    [] Estim:  []Unatt       []IFC  []Premod                        []Other:  []w/ice   []w/heat  Position:  Location:    [] Estim: []Att    []TENS instruct  []NMES                    []Other:  []w/US   []w/ice   []w/heat  Position:  Location:    []  Traction: [] Cervical       []Lumbar                       [] Prone          []Supine                       []Intermittent   []Continuous Lbs:  [] before manual  [] after manual    []  Ultrasound: []Continuous   [] Pulsed                           []1MHz   []3MHz W/cm2:  Location:    []  Iontophoresis with dexamethasone         Location: [] Take home patch   [] In clinic    []  Ice     []  heat  []  Ice massage  []  Laser   []  Anodyne Position:  Location:    []  Laser with stim  []  Other:  Position:  Location:    []  Vasopneumatic Device Pressure:       [] lo [] med [] hi   Temperature: [] lo [] med [] hi   [] Skin assessment post-treatment:  []intact []redness- no adverse reaction    []redness - adverse reaction:     49 min Therapeutic Exercise:  [x] See flow sheet :   Rationale: increase ROM and increase strength to improve the patients ability to perform daily tasks    10 min Neuromuscular Re-education:  [x]? See flow sheet : [place] activities, core stabilization. Rationale: increase strength and increase proprioception  to improve the patients ability to perform functional activities.     8 min Manual Therapy:  DTM/TPR Right QL, L/S paraspinals, piriformis and gluteals, with Right hip PROM IR/ER. Pt prone. Rationale: decrease pain, increase ROM, increase tissue extensibility and decrease trigger points to improve ease of performing functional activities. With   [] TE   [] TA   [] neuro   [] other: Patient Education: [x] Review HEP    [] Progressed/Changed HEP based on:   [] positioning   [] body mechanics   [] transfers   [] heat/ice application    [] other:      Other Objective/Functional Measures:  Updated HEP with therex     Pain Level (0-10 scale) post treatment: 0    ASSESSMENT/Changes in Function: pt has made significant progress with PT. He reports 80-90% improvement and has returned to all activities. He plans to continue with HEP and with his current gym workout. []  See Plan of Care  []  See progress note/recertification  [x]  See Discharge Summary         Progress towards goals   1) Goal: Patient will improve FOTO assessment score to 82 pts in order to indicate improved functional abilities. Status at last note/certification: 55 EFA  2) Goal: Patient will improve NEVILLE hip extension to 20 deg for improved posture and gait mechanics  Status at last note/certification:L=5/20, R 0 /20 deg  Current: not met left 10 degrees, right 5 degrees  3) Goal: Patient will report worst back pain as 4/10 or less in order to progress toward personal goals. Status at last note/certification: 61/34  Current: MET on 12-26-19  4) Goal: Patient will report overall at least 85% improvement in function in order to progress toward premorbid status.   Status at last note/certification: n/a  Current: MET  5) Goal: Patient will report ability to golf and do gym routine at 100% ability for general health  Status at last note/certification: Pt reports ability to perform 50% his typical activities  Current: MET pt reports being back to all activities.      PLAN  []  Upgrade activities as tolerated     []  Continue plan of care  []  Update interventions per flow sheet       [x]  Discharge due to:_  []  Other:_      Henrietta Saravia, PT 1/2/2020  8:51 AM    Future Appointments   Date Time Provider Juarez Loco   1/2/2020  9:00 AM Catrachita Salvador, PT [facility]

## 2020-01-02 NOTE — PROGRESS NOTES
[facility]  [address] [facility] [address]  [place], [address]  [phone] fax    Physical Therapy Discharge Summary    Patient name: Vamsi Avila Start of Care: 12/3/2019   Referral source: Nahomy Marley MD : 1940                Medical Diagnosis: Low back pain [M54.5]  Payor: Clari Rand / Plan: VA MEDICARE PART A & B / Product Type: Medicare /  Onset Date:11/15/19                Treatment Diagnosis: Low back pain [M54.5]   Prior Hospitalization: see medical history Provider#: [id number]   Medications: Verified on Patient summary List    Comorbidities: hx of prostate cancer, thyroid problems, alcohol use, HTN, cancer, (he is currently being tested for kidney disease)   Prior Level of Function: functionally I with all activities, golfer    Visits from Start of Care: 10    Missed Visits: 0  Reporting Period : 12-3-19 to 20    Summary of Care:   pt has made significant progress with PT. He reports 80-90% improvement and has returned to all activities. He plans to continue with HEP and with his current gym workout. Progress towards goals   1) Goal: Patient will improve FOTO assessment score to 82 pts in order to indicate improved functional abilities. Status at last note/certification: 62 JANELLE  2) Goal: Patient will improve NEVILLE hip extension to 20 deg for improved posture and gait mechanics  Status at last note/certification:L=5/20, R 0 /20 deg  Current: not met left 10 degrees, right 5 degrees  3) Goal: Patient will report worst back pain as 4/10 or less in order to progress toward personal goals. Status at last note/certification: 46/76  Current: MET on 19  4) Goal: Patient will report overall at least 85% improvement in function in order to progress toward premorbid status.   Status at last note/certification: n/a  Current: MET  5) Goal: Patient will report ability to golf and do gym routine at 100% ability for general health  Status at last note/certification: Pt reports ability to perform 50% his typical activities  Current: MET pt reports being back to all activities.            ASSESSMENT/RECOMMENDATIONS:  [x]Discontinue therapy: [x]Patient has reached or is progressing toward set goals      []Patient is non-compliant or has abdicated      []Due to lack of appreciable progress towards set goals    Phoebe Stewart, PT 1/2/2020 11:05 AM

## 2020-04-21 ENCOUNTER — HOSPITAL ENCOUNTER (OUTPATIENT)
Dept: PHYSICAL THERAPY | Age: 80
Discharge: HOME | End: 2020-04-21
Payer: MEDICARE

## 2020-04-21 PROCEDURE — 97162 PT EVAL MOD COMPLEX 30 MIN: CPT

## 2020-04-21 PROCEDURE — 97110 THERAPEUTIC EXERCISES: CPT

## 2020-04-21 NOTE — PROGRESS NOTES
[facility]  [address]  [place], [address]  [phone] fax    Plan of Care/ Statement of Necessity for Physical Therapy Services    Patient name: Debo Quigley Start of Care: 2020   Referral source: Deonnagerber Segura DO : 1940    Medical Diagnosis: Pain in left shoulder [M25.512]  Payor: Clari Seeds / Plan: VA MEDICARE PART A & B / Product Type: Medicare /  Onset Date: 2020    Treatment Diagnosis: left shoulder pain    Prior Hospitalization: see medical history Provider#: [id number]   Medications: Verified on Patient summary List    Comorbidities: cancer, LBP, heart disease, HTN   Prior Level of Function: functionally I with daily tasks      The Plan of Care and following information is based on the information from the initial evaluation. Assessment/ key information: 69 y/o male presents with c/o left shoulder pain that started earlier this month. He reports no specific injury but started to notice it more with working out with weights. Currently the pt reports constant pain and limited ability for performing daily activities. The pt demonstrates limited AROM and PROM of the left shoulder with c/o pain in all planes of motion. MMT reveals weakness throughout the left shoulder complex. Left shoulder with + tenderness noted over the infraspinatus and supraspinatus. Pt will benefit from PT to address the aforementioned impairments. Due to covid-19 he will be performing virtual sessions at this time. Evaluation Complexity History MEDIUM  Complexity : 1-2 comorbidities / personal factors will impact the outcome/ POC ; Examination MEDIUM Complexity : 3 Standardized tests and measures addressing body structure, function, activity limitation and / or participation in recreation  ;Presentation MEDIUM Complexity : Evolving with changing characteristics  ; Clinical Decision Making MEDIUM Complexity : FOTO score of 26-74  Overall Complexity Rating: MEDIUM  Problem List: pain affecting function, decrease ROM, decrease strength, decrease ADL/ functional abilitiies, decrease activity tolerance and decrease flexibility/ joint mobility   Treatment Plan may include any combination of the following: Therapeutic exercise, Therapeutic activities, Neuromuscular re-education, Physical agent/modality, Manual therapy, Patient education and Self Care training  Patient / Family readiness to learn indicated by: asking questions, trying to perform skills and interest  Persons(s) to be included in education: patient (P)  Barriers to Learning/Limitations: None  Patient Goal (s): To decrease the pain and improve function  Patient Self Reported Health Status: good  Rehabilitation Potential: good    Short Term Goals: To be accomplished in 1 weeks:   1 pt will be I and compliant with HEP      Long Term Goals: To be accomplished in 4 weeks:   1. Improve FOTO to 72 to improve ability for daily tasks. 2. Improve left shoulder AROM flexion to equal the right to improve ability for daily tasks. 3. Pt will report at least 75% improvement of the left shoulder to improve ability for daily tasks. 4. Pt will report no difficulty with reaching a shelf overhead. Frequency / Duration: Patient to be seen 1-2 times per week for 4 weeks. Patient/ Caregiver education and instruction: Diagnosis, prognosis, self care, activity modification and exercises   [x]  Plan of care has been reviewed with PTA    Certification Period: 04/21/20 to 05/20/20  Ariel Townsend, PT 4/21/2020 3:05 PM    ________________________________________________________________________    I certify that the above Therapy Services are being furnished while the patient is under my care. I agree with the treatment plan and certify that this therapy is necessary.     [de-identified] Signature:____________________  Date:____________Time: _________    Please sign and return to [facility]  [address]  [place], [address]  [phone] fax

## 2020-04-21 NOTE — PROGRESS NOTES
PT DAILY TREATMENT NOTE 10-18    Patient Name: Donnie Caicedo  Date:2020  : 1940  [x]  Patient  Verified  Payor: VA MEDICARE / Plan: VA MEDICARE PART A & B / Product Type: Medicare /    In time:1:35  Out time:2:35  Total Treatment Time (min): 60  Visit #: 1 of 4    Medicare/BCBS Only   Total Timed Codes (min):  30 1:1 Treatment Time:  60       Treatment Area: Pain in left shoulder [M25.512]    SUBJECTIVE  Pain Level (0-10 scale): 3-4  Any medication changes, allergies to medications, adverse drug reactions, diagnosis change, or new procedure performed?: [x] No    [] Yes (see summary sheet for update)  Subjective functional status/changes:   [] No changes reported       OBJECTIVE    30 min [x]Eval                  []Re-Eval       30 min Therapeutic Exercise:  [] See flow sheet : HEP instruction, activity modification with recreation   Rationale: increase ROM and increase strength to improve the patients ability to perform ADL              With   [] TE   [] TA   [] neuro   [] other: Patient Education: [x] Review HEP    [] Progressed/Changed HEP based on:   [] positioning   [] body mechanics   [] transfers   [] heat/ice application    [] other:      Other Objective/Functional Measures:       Pain Level (0-10 scale) post treatment: 3-4/10    ASSESSMENT/Changes in Function:      Patient will continue to benefit from skilled PT services to modify and progress therapeutic interventions, address functional mobility deficits, address ROM deficits, address strength deficits, analyze and address soft tissue restrictions and analyze and cue movement patterns to attain remaining goals. [x]  See Plan of Care  []  See progress note/recertification  []  See Discharge Summary         Progress towards goals / Updated goals:  Short Term Goals: To be accomplished in 1 weeks:   1 pt will be I and compliant with HEP    IE- instructed and issued HEP  Long Term Goals: To be accomplished in 4 weeks:   1.  Improve FOTO to 72 to improve ability for daily tasks. IE- 60   2. Improve left shoulder AROM flexion to equal the right to improve ability for daily tasks. IE- left 140 degrees, right 160 degrees   3. Pt will report at least 75% improvement of the left shoulder to improve ability for daily tasks. IE- none   4. Pt will report no difficulty with reaching a shelf overhead. IE- some difficulty.      PLAN  []  Upgrade activities as tolerated     [x]  Continue plan of care  []  Update interventions per flow sheet       []  Discharge due to:_  []  Other:_      Lei Mathew, PT 4/21/2020  2:59 PM    Future Appointments   Date Time Provider Juarez Loco   4/28/2020  9:45 AM [facility]   5/5/2020  9:00 AM Carey Mahajan, PT [facility]   5/12/2020  9:00 AM Carey Mahajan, PT [facility]

## 2020-04-28 ENCOUNTER — HOSPITAL ENCOUNTER (OUTPATIENT)
Dept: PHYSICAL THERAPY | Age: 80
Discharge: HOME | End: 2020-04-28
Payer: MEDICARE

## 2020-04-28 PROCEDURE — 97535 SELF CARE MNGMENT TRAINING: CPT

## 2020-04-28 PROCEDURE — 97110 THERAPEUTIC EXERCISES: CPT

## 2020-04-28 NOTE — PROGRESS NOTES
PT DAILY TREATMENT NOTE 10-18    Patient Name: Jalen Baig  Date:2020  : 1940  [x]  Patient  Verified  Payor: Sivan Julio C / Plan: VA MEDICARE PART A & B / Product Type: Medicare /    In XISC:9971  Out time:1011  Total Treatment Time (min): 29  Visit #: 2 of 4  Jalen Baig was informed of the inherent limitations of a virtual visit,  and has consented to a virtual therapy visit on 2020. Information regarding emergency contact information for this patient during this visit is to contact:  Kurt Mayfield [phone] in addition to calling 911. The patient was informed that at any time during the virtual visit, they can decide to stop the virtual visit. The patient verified that they are physically located in the [facility] for this virtual visit.         Medicare/BCBS Only   Total Timed Codes (min):  29 1:1 Treatment Time:  29       Treatment Area: Pain in left shoulder [M25.512]    SUBJECTIVE  Pain Level (0-10 scale): 3  Any medication changes, allergies to medications, adverse drug reactions, diagnosis change, or new procedure performed?: [x] No    [] Yes (see summary sheet for update)  Subjective functional status/changes:   [] No changes reported   pt reports compliance with HEP    OBJECTIVE    Modality rationale:    Min Type Additional Details    [] Estim:  []Unatt       []IFC  []Premod                        []Other:  []w/ice   []w/heat  Position:  Location:    [] Estim: []Att    []TENS instruct  []NMES                    []Other:  []w/US   []w/ice   []w/heat  Position:  Location:    []  Traction: [] Cervical       []Lumbar                       [] Prone          []Supine                       []Intermittent   []Continuous Lbs:  [] before manual  [] after manual    []  Ultrasound: []Continuous   [] Pulsed                           []1MHz   []3MHz W/cm2:  Location:    []  Iontophoresis with dexamethasone         Location: [] Take home patch   [] In clinic    []  Ice []  heat  []  Ice massage  []  Laser   []  Anodyne Position:  Location:    []  Laser with stim  []  Other:  Position:  Location:    []  Vasopneumatic Device Pressure:       [] lo [] med [] hi   Temperature: [] lo [] med [] hi   [] Skin assessment post-treatment:  []intact []redness- no adverse reaction    []redness - adverse reaction:        20 min Therapeutic Exercise:  [x] See flow sheet :   Rationale: increase ROM and increase strength to improve the patients ability to perform daily activities with left UE  9 min Self Care/Home Management:  Progression of therex with HEP, pain control with functional activities, self myofascial release to left biceps   Rationale: diminish pain and tightness  to improve the patients ability to perform ADL                 With   [] TE   [] TA   [] neuro   [] other: Patient Education: [x] Review HEP    [] Progressed/Changed HEP based on:   [] positioning   [] body mechanics   [] transfers   [] heat/ice application    [] other:      Other Objective/Functional Measures: virtual visit performed     Pain Level (0-10 scale) post treatment: 3    ASSESSMENT/Changes in Function:  The pt demonstrates understanding of adjustment of therex to avoid increase in pain. He is independent with most therex but requires cues with ER with cane. Overall he seems to be progressing with therex. Patient will continue to benefit from skilled PT services to modify and progress therapeutic interventions, address functional mobility deficits, address ROM deficits, address strength deficits, analyze and address soft tissue restrictions and analyze and cue movement patterns to attain remaining goals. []  See Plan of Care  []  See progress note/recertification  []  See Discharge Summary         Progress towards goals / Updated goals:  Short Term Goals:  To be accomplished in 1 weeks:              1 pt will be I and compliant with HEP               IE- instructed and issued HEP   Current: progressing well, compliant and nearly independent 4-28-20  Long Term Goals: To be accomplished in 4 weeks:              1. Improve FOTO to 72 to improve ability for daily tasks. IE- 60              2. Improve left shoulder AROM flexion to equal the right to improve ability for daily tasks. IE- left 140 degrees, right 160 degrees              3. Pt will report at least 75% improvement of the left shoulder to improve ability for daily tasks. IE- none              4. Pt will report no difficulty with reaching a shelf overhead. IE- some difficulty.        PLAN  []  Upgrade activities as tolerated     [x]  Continue plan of care  []  Update interventions per flow sheet       []  Discharge due to:_  []  Other:_    Satnam Dupont is a 70 y.o. male being evaluated by a Virtual Visit (video visit) encounter to address concerns as mentioned above. A caregiver was present when appropriate. Due to this being a TeleHealth encounter (During AZRZX-70 public health emergency), evaluation of the following areas was limited: Direct tissue palpation, direct goniometric measurements, blood pressure, O2 saturation. Pursuant to the emergency declaration under the [address] [place] [address], [address] [place] [address] authority and the [employer] Act, this Virtual Visit was conducted with patient's (and/or legal guardian's) consent, to reduce the risk of exposure to COVID-19 and provide necessary medical care. Services were provided through a video synchronous discussion virtually to substitute for in-person encounter. --Ger Obregon, PT on 4/28/2020 at 10:19 AM    An electronic signature was used to authenticate this note.       Ger Obregon PT 4/28/2020  9:42 AM    Future Appointments   Date Time Provider Juarez Loco   4/28/2020  9:45 AM Maritza Matos, ROBERTA [facility]   5/5/2020  9:00 AM Eloise Guzman [facility]   5/12/2020  9:00 AM [facility]

## 2020-05-05 ENCOUNTER — HOSPITAL ENCOUNTER (OUTPATIENT)
Dept: PHYSICAL THERAPY | Age: 80
Discharge: HOME | End: 2020-05-05
Payer: MEDICARE

## 2020-05-05 PROCEDURE — 97110 THERAPEUTIC EXERCISES: CPT

## 2020-05-05 NOTE — PROGRESS NOTES
PT DAILY TREATMENT NOTE 10-18    Patient Name: Zahra Khan  Date:2020  : 1940  [x]  Patient  Verified  Payor: Michael Cohn / Plan: [place] MEDICARE PART A & B / Product Type: Medicare /    In THWN:[id number]  Out QUCM:3496  Total Treatment Time (min): 23  Visit #: 3 of 4    Zahra Khan was informed of the inherent limitations of a virtual visit,  and has consented to a virtual therapy visit on 2020. Information regarding emergency contact information for this patient during this visit is to contact:  Krish Mayfield [phone] in addition to calling 911. The patient was informed that at any time during the virtual visit, they can decide to stop the virtual visit. The patient verified that they are physically located in the [facility] for this virtual visit. Medicare/BCBS Only   Total Timed Codes (min):  23 1:1 Treatment Time:  23       Treatment Area: Pain in left shoulder [M25.512]    SUBJECTIVE  Pain Level (0-10 scale): 3  Any medication changes, allergies to medications, adverse drug reactions, diagnosis change, or new procedure performed?: [x] No    [] Yes (see summary sheet for update)  Subjective functional status/changes:   [] No changes reported  The pt reports some improvement but is having pain in the biceps.      OBJECTIVE    Modality rationale:    Min Type Additional Details    [] Estim:  []Unatt       []IFC  []Premod                        []Other:  []w/ice   []w/heat  Position:  Location:    [] Estim: []Att    []TENS instruct  []NMES                    []Other:  []w/US   []w/ice   []w/heat  Position:  Location:    []  Traction: [] Cervical       []Lumbar                       [] Prone          []Supine                       []Intermittent   []Continuous Lbs:  [] before manual  [] after manual    []  Ultrasound: []Continuous   [] Pulsed                           []1MHz   []3MHz W/cm2:  Location:    []  Iontophoresis with dexamethasone         Location: [] Take home patch   [] In clinic    []  Ice     []  heat  []  Ice massage  []  Laser   []  Anodyne Position:  Location:    []  Laser with stim  []  Other:  Position:  Location:    []  Vasopneumatic Device Pressure:       [] lo [] med [] hi   Temperature: [] lo [] med [] hi   [] Skin assessment post-treatment:  []intact []redness- no adverse reaction    []redness - adverse reaction:       18 min Therapeutic Exercise:  [x] See flow sheet :   Rationale: increase ROM and increase strength to improve the patients ability to perform functional activities. 5 min Self Care/Home Management: reviewed use of ice following exercises, instructed in self TrP release for biceps   Rationale: diminish pain and trigger points  to improve the patients ability to perform ADL            With   [] TE   [] TA   [] neuro   [] other: Patient Education: [x] Review HEP    [] Progressed/Changed HEP based on:   [] positioning   [] body mechanics   [] transfers   [] heat/ice application    [] other:      Other Objective/Functional Measures:  + active trigger point reported with self palpation of distal biceps     Pain Level (0-10 scale) post treatment: 3    ASSESSMENT/Changes in Function:  The pt demonstrates improved shoulder pain but is having pain into the biceps region. He was instructed in self TrP release to perform this week. Patient will continue to benefit from skilled PT services to modify and progress therapeutic interventions, address functional mobility deficits, address ROM deficits, address strength deficits, analyze and address soft tissue restrictions and analyze and cue movement patterns to attain remaining goals.      []  See Plan of Care  []  See progress note/recertification  []  See Discharge Summary         Progress towards goals / Updated goals:  Short Term Goals: To be accomplished in 1 weeks:              1 pt will be I and compliant with HEP               IE- instructed and issued HEP              Current: progressing well, compliant and nearly independent 4-28-20  Long Term Goals: To be accomplished in 4 weeks:              1. Improve FOTO to 72 to improve ability for daily tasks.               LF- 60              2. Improve left shoulder AROM flexion to equal the right to improve ability for daily tasks.               IE- left 140 degrees, right 160 degrees   Current: progressing but not yet met on 5-5-20              3. Pt will report at least 75% improvement of the left shoulder to improve ability for daily tasks.               IE- none   Current: some improvement noted 5-5-20              4. Pt will report no difficulty with reaching a shelf overhead.               IE- some difficulty.        PLAN  []  Upgrade activities as tolerated     [x]  Continue plan of care  []  Update interventions per flow sheet       []  Discharge due to:_  []  Other:_    Sylvia Cowan is a 70 y.o. male being evaluated by a Virtual Visit (video visit) encounter to address concerns as mentioned above. A caregiver was present when appropriate. Due to this being a TeleHealth encounter (During SRKVJ-09 public health emergency), evaluation of the following areas was limited: Direct tissue palpation, direct goniometric measurements, blood pressure, O2 saturation. Pursuant to the emergency declaration under the [facility], [address] [place] [address] authority and the [employer] and [employer] General Act, this Virtual Visit was conducted with patient's (and/or legal guardian's) consent, to reduce the risk of exposure to COVID-19 and provide necessary medical care. Services were provided through a video synchronous discussion virtually to substitute for in-person encounter. --Breonna Johnston PT on 5/5/2020 at 8:58 AM    An electronic signature was used to authenticate this note.       Breonna Johnston PT 5/5/2020  8:57 AM    Future Appointments   Date Time Provider Juarez Loco   5/5/2020 9:00 [facility]   5/12/2020  9:00 AM [facility]

## 2020-05-12 ENCOUNTER — HOSPITAL ENCOUNTER (OUTPATIENT)
Dept: PHYSICAL THERAPY | Age: 80
Discharge: HOME | End: 2020-05-12
Payer: MEDICARE

## 2020-05-12 PROCEDURE — 97110 THERAPEUTIC EXERCISES: CPT

## 2020-05-12 NOTE — PROGRESS NOTES
PT DAILY TREATMENT NOTE 10-18    Patient Name: Donnie Caicedo  Date:2020  : 1940  [x]  Patient  Verified  Payor: Que Alt / Plan: VA MEDICARE PART A & B / Product Type: Medicare /    In time:09  Out time:923  Total Treatment Time (min): 23  Visit #: 4 of 4-8  Low [employer]ins was informed of the inherent limitations of a virtual visit,  and has consented to a virtual therapy visit on 2020.  Information regarding emergency contact information for this patient during this visit is to contact: [address] [facility] [place] [phone] MELISSA addition to calling [employer] patient was informed that at any time during the virtual visit, they can decide to stop the virtual visit. Jaime Garcia patient verified that they are physically located in the [facility] for this virtual visit. Medicare/BCBS Only   Total Timed Codes (min):  23 1:1 Treatment Time:  23       Treatment Area: Pain in left shoulder [M25.512]    SUBJECTIVE  Pain Level (0-10 scale): 3  Any medication changes, allergies to medications, adverse drug reactions, diagnosis change, or new procedure performed?: [x] No    [] Yes (see summary sheet for update)  Subjective functional status/changes:   [] No changes reported  The pt reports improvement since last week but is still having some pain.      OBJECTIVE    Modality rationale:    Min Type Additional Details    [] Estim:  []Unatt       []IFC  []Premod                        []Other:  []w/ice   []w/heat  Position:  Location:    [] Estim: []Att    []TENS instruct  []NMES                    []Other:  []w/US   []w/ice   []w/heat  Position:  Location:    []  Traction: [] Cervical       []Lumbar                       [] Prone          []Supine                       []Intermittent   []Continuous Lbs:  [] before manual  [] after manual    []  Ultrasound: []Continuous   [] Pulsed                           []1MHz   []3MHz W/cm2:  Location:    []  Iontophoresis with dexamethasone         Location: [] Take home patch   [] In clinic    []  Ice     []  heat  []  Ice massage  []  Laser   []  Anodyne Position:  Location:    []  Laser with stim  []  Other:  Position:  Location:    []  Vasopneumatic Device Pressure:       [] lo [] med [] hi   Temperature: [] lo [] med [] hi   [] Skin assessment post-treatment:  []intact []redness- no adverse reaction    []redness - adverse reaction:         18 min Therapeutic Exercise:  [] See flow sheet : PT reviewed HEP and demonstrated as needed. Pt performed exercises in question from HEP. Rationale: increase ROM and increase strength to improve the patients ability to perform functional tasks    5 min Self Care/Home Management: self massage/TrP release to left biceps   Rationale: decrease myofascial restrictions  to improve the patients ability to perform functional tasks. With   [] TE   [] TA   [] neuro   [] other: Patient Education: [x] Review HEP    [] Progressed/Changed HEP based on:   [] positioning   [] body mechanics   [] transfers   [] heat/ice application    [] other:      Other Objective/Functional Measures:  AROM shoulder flexion is equal on left and right     Pain Level (0-10 scale) post treatment: 3    ASSESSMENT/Changes in Function: the pt is progressing gradually with PT. He appears to be in need of manual therapy but limited due to COVID 19. PT will perform 1 additional telehealth visit and then assess for possible need for in clinic treatment. Patient will continue to benefit from skilled PT services to modify and progress therapeutic interventions, address functional mobility deficits, address ROM deficits, address strength deficits and analyze and address soft tissue restrictions to attain remaining goals.      []  See Plan of Care  []  See progress note/recertification  []  See Discharge Summary         Progress towards goals / Updated goals:  Short Term Goals: To be accomplished in 1 weeks:              1 pt will be I and compliant with HEP             YO- instructed and issued HEP              UTTMODJ: progressing well, compliant and nearly independent 4-28-20  Long Term Goals: To be accomplished in 4 weeks:              1. Improve FOTO to 72 to improve ability for daily tasks.               BT- 60              2. Improve left shoulder AROM flexion to equal the right to improve ability for daily tasks.               IE- left 140 degrees, right 160 degrees              Current: MET on 5-12-20              3. Pt will report at least 75% improvement of the left shoulder to improve ability for daily tasks.               IE- none              Current: progressing 5-12-20              4. Pt will report no difficulty with reaching a shelf overhead.               IE- some difficulty.        PLAN  []  Upgrade activities as tolerated     [x]  Continue plan of care  []  Update interventions per flow sheet       []  Discharge due to:_  []  Other:_    Afua Bowser is a 70 y.o. male being evaluated by a Virtual Visit (video visit) encounter to address concerns as mentioned above. A caregiver was present when appropriate. Due to this being a TeleHealth encounter (During [facility]-82 public health emergency), evaluation of the following areas was limited: Direct tissue palpation, direct goniometric measurements, blood pressure, O2 saturation. Pursuant to the emergency declaration under the [address] [place] [address] authority and the [employer] Act, this Virtual Visit was conducted with patient's (and/or legal guardian's) consent, to reduce the risk of exposure to COVID-19 and provide necessary medical care. Services were provided through a video synchronous discussion virtually to substitute for in-person encounter. --Elio Daniel PT on 5/12/2020 at 9:28 AM    An electronic signature was used to authenticate this note.       Elio Daniel, PT 5/12/2020  9:00 AM    No future appointments.

## 2020-05-19 ENCOUNTER — HOSPITAL ENCOUNTER (OUTPATIENT)
Dept: PHYSICAL THERAPY | Age: 80
Discharge: HOME | End: 2020-05-19
Payer: MEDICARE

## 2020-05-19 PROCEDURE — 97110 THERAPEUTIC EXERCISES: CPT

## 2020-05-19 NOTE — PROGRESS NOTES
[facility]  [address]  [place], [address]  [phone] fax    Continued Plan of Care/ Re-certification for Physical Therapy Services    Patient name: Zahra Khan Start of Care: 2020   Referral source: Sanjana NelsonharpreetDO : 1940               Medical Diagnosis: Pain in left shoulder [M25.512]  Payor: Michael Cohn / Plan: VA MEDICARE PART A & B / Product Type: Medicare /  Onset Date: 2020               Treatment Diagnosis: left shoulder pain    Prior Hospitalization: see medical history Provider#: [id number]   Medications: Verified on Patient summary List    Comorbidities: cancer, LBP, heart disease, HTN   Prior Level of Function: functionally I with daily tasks    Visits from Start of Care: 5    Missed Visits: 0    The Plan of Care and following information is based on the patient's current status:            Progress towards goals:  Short Term Goals: To be accomplished in 1 weeks:              1 pt will be I and compliant with HEP               IE- instructed and issued HEP              YNKGUWW: MET on 20  Long Term Goals: To be accomplished in 4 weeks:              1. Improve FOTO to 72 to improve ability for daily tasks.               IE- 60   Current: progressing, 63 on 20              2. Improve left shoulder AROM flexion to equal the right to improve ability for daily tasks.               IE- left 140 degrees, right 160 degrees              Current: MET on 20              3. Pt will report at least 75% improvement of the left shoulder to improve ability for daily tasks.               IE- none              Current: progressing 50% on  20               4. Pt will report no difficulty with reaching a shelf overhead.               IE- some difficulty. Current: progressing a little difficulty on 20      Key functional changes:   The patient has made progress with telehealth sessions but is in need of manual therapy at this time. He will benefit from in clinic sessions to improve his functional use of the left UE. Patient will continue to benefit from skilled PT services to modify and progress therapeutic interventions, address functional mobility deficits, address ROM deficits, address strength deficits, analyze and address soft tissue restrictions and analyze and cue movement patterns to attain remaining goals. Problems/ barriers to goal attainment:  none     Problem List: pain affecting function, decrease ROM, decrease strength, decrease ADL/ functional abilitiies, decrease activity tolerance and decrease flexibility/ joint mobility    Treatment Plan: Therapeutic exercise, Therapeutic activities, Neuromuscular re-education, Physical agent/modality, Manual therapy, Patient education and Self Care training     Patient Goal (s) has been updated and includes: \"To continue to improve and use left arm without pain\"     Goals for this certification period to be accomplished in 4 weeks:   1. Improve FOTO to 72 to improve ability for daily tasks.               PN: 63 on 5-19-20                2. Pt will report at least 75% improvement of the left shoulder to improve ability for daily tasks.   [place]: progressing 50% on  5-19-20                 3. Pt will report no difficulty with reaching a shelf overhead.             ST[de-identified] progressing a little difficulty on 5-19-20     4. The pt will demonstrate 4+/5 left shoulder scaption strength to improve ability for daily tasks. PN: 4/5     Frequency / Duration: Patient to be seen 1-2 times per week for 4 weeks:    Assessment / Recommendations: The pt has progressed with telehealth sessions but is in need of manual therapy at this time to maximize left shoulder function. The pt will attend PT in clinic starting next week.      Certification Period: 5/26/20 to 6/24/20    Elio Daniel PT 5/19/2020 8:45 AM    ________________________________________________________________________  I certify that the above Therapy Services are being furnished while the patient is under my care. I agree with the treatment plan and certify that this therapy is necessary. [] I have read the above and request that my patient continue as recommended.   [] I have read the above report and request that my patient continue therapy with the following changes/special instructions: _____________________________________________  [] I have read the above report and request that my patient be discharged from therapy    Physician's Signature:____________Date:_________TIME:________    ** Signature, Date and Time must be completed for valid certification **    Please sign and return to [facility] [address] [address] [address]  [place], [facility] [address]  [phone] fax

## 2020-05-19 NOTE — PROGRESS NOTES
PT DAILY TREATMENT NOTE 10-18    Patient Name: Otilia Cruz  Date:2020  : 1940  [x]  Patient  Verified  Payor: Isidra Josue / Plan: VA MEDICARE PART A & B / Product Type: Medicare /    In [facility] [id number]  Total Treatment Time (min): 24  Visit #: 5 of [address] [place] [address] informed of the inherent limitations of a virtual visit,  and has consented to a virtual therapy visit on 2020.  Information regarding emergency contact information for this patient during this visit is to contact: [address] [facility] [place] [phone] ES addition to calling [employer] [employer] patient was informed that at any time during the virtual visit, they can decide to stop the virtual visit. Maru Gallardo patient verified that they are physically located in the [facility] for this virtual visit.     Medicare/BCBS Only   Total Timed Codes (min):  24 1:1 Treatment Time:  24       Treatment Area: Pain in left shoulder [M25.512]    SUBJECTIVE  Pain Level (0-10 scale): 2-3  Any medication changes, allergies to medications, adverse drug reactions, diagnosis change, or new procedure performed?: [x] No    [] Yes (see summary sheet for update)  Subjective functional status/changes:   [] No changes reported  The pt reports he feels like he is improving but is still limited with use of left UE.     OBJECTIVE    Modality rationale:    Min Type Additional Details    [] Estim:  []Unatt       []IFC  []Premod                        []Other:  []w/ice   []w/heat  Position:  Location:    [] Estim: []Att    []TENS instruct  []NMES                    []Other:  []w/US   []w/ice   []w/heat  Position:  Location:    []  Traction: [] Cervical       []Lumbar                       [] Prone          []Supine                       []Intermittent   []Continuous Lbs:  [] before manual  [] after manual    []  Ultrasound: []Continuous   [] Pulsed                           []1MHz   []3MHz W/cm2:  Location:    []  Iontophoresis with dexamethasone Location: [] Take home patch   [] In clinic    []  Ice     []  heat  []  Ice massage  []  Laser   []  Anodyne Position:  Location:    []  Laser with stim  []  Other:  Position:  Location:    []  Vasopneumatic Device Pressure:       [] lo [] med [] hi   Temperature: [] lo [] med [] hi   [] Skin assessment post-treatment:  []intact []redness- no adverse reaction    []redness - adverse reaction:       24 min Therapeutic Exercise:  [] See flow sheet : Performed HEP exercises,education with therex and self TPR release   Rationale: increase ROM and increase strength to improve the patients ability to perform daily activities             With   [] TE   [] TA   [] neuro   [] other: Patient Education: [x] Review HEP    [] Progressed/Changed HEP based on:   [] positioning   [] body mechanics   [] transfers   [] heat/ice application    [] other:      Other Objective/Functional Measures: FOTO: 63     Pain Level (0-10 scale) post treatment: 2-3    ASSESSMENT/Changes in Function:  The patient has made progress with telehealth sessions but is in need of manual therapy at this time. He will benefit from in clinic sessions to improve his functional use of the left UE. Patient will continue to benefit from skilled PT services to modify and progress therapeutic interventions, address functional mobility deficits, address ROM deficits, address strength deficits, analyze and address soft tissue restrictions and analyze and cue movement patterns to attain remaining goals. []  See Plan of Care  [x]  See progress note/recertification  []  See Discharge Summary         Progress towards goals / Updated goals:  Short Term Goals: To be accomplished in 1 weeks:              1 pt will be I and compliant with HEP               IE- instructed and issued HEP              [id number]: MET on 5-19-20  Long Term Goals: To be accomplished in 4 weeks:              1.  Improve FOTO to 72 to improve ability for daily tasks.               IE- 60   Current: progressing, 63 on 5-19-20              2. Improve left shoulder AROM flexion to equal the right to improve ability for daily tasks.               IE- left 140 degrees, right 160 degrees              Current: MET on 5-12-20              3. Pt will report at least 75% improvement of the left shoulder to improve ability for daily tasks.               IE- none              Current: progressing 50% on  5-19-20               4. Pt will report no difficulty with reaching a shelf overhead.               IE- some difficulty. Current: progressing a little difficulty on 5-19-20       PLAN  []  Upgrade activities as tolerated     [x]  Continue plan of care  []  Update interventions per flow sheet       []  Discharge due to:_  []  Other:_      Donnie Caicedo is a 70 y.o. male being evaluated by a Virtual Visit (video visit) encounter to address concerns as mentioned above. A caregiver was present when appropriate. Due to this being a TeleHealth encounter (During XLL-13 public health emergency), evaluation of the following areas was limited: Direct tissue palpation, direct goniometric measurements, blood pressure, O2 saturation. Pursuant to the emergency declaration under the [address] [place] [address], [address] [place] [address] authority and the [employer] and [employer] General Act, this Virtual Visit was conducted with patient's (and/or legal guardian's) consent, to reduce the risk of exposure to COVID-19 and provide necessary medical care. Services were provided through a video synchronous discussion virtually to substitute for in-person encounter. --Lani Grider, PT on 5/19/2020 at 8:44 AM    An electronic signature was used to authenticate this note.     Lani Grider PT 5/19/2020  8:43 AM    Future Appointments   Date Time Provider Juarez Loco   5/19/2020  9:00 AM Yanelis Whittington, PT [facility]

## 2020-05-27 ENCOUNTER — HOSPITAL ENCOUNTER (OUTPATIENT)
Dept: PHYSICAL THERAPY | Age: 80
Discharge: HOME | End: 2020-05-27
Payer: MEDICARE

## 2020-05-27 PROCEDURE — 97110 THERAPEUTIC EXERCISES: CPT

## 2020-05-27 PROCEDURE — 97140 MANUAL THERAPY 1/> REGIONS: CPT

## 2020-06-03 ENCOUNTER — HOSPITAL ENCOUNTER (OUTPATIENT)
Dept: PHYSICAL THERAPY | Age: 80
Discharge: HOME | End: 2020-06-03
Payer: MEDICARE

## 2020-06-03 PROCEDURE — 97140 MANUAL THERAPY 1/> REGIONS: CPT

## 2020-06-03 PROCEDURE — 97110 THERAPEUTIC EXERCISES: CPT

## 2020-06-03 NOTE — PROGRESS NOTES
PT DAILY TREATMENT NOTE 10-18    Patient Name: Marylou Adames  Date:6/3/2020  : 1940  [x]  Patient  Verified  Payor: [place] MEDICARE / Plan: [place] MEDICARE PART A & B / Product Type: Medicare /    In time:1025  Out time:1115  Total Treatment Time (min): 50  Visit #: 2 of 4-8    Medicare/BCBS Only   Total Timed Codes (min):  50 1:1 Treatment Time:  45       Treatment Area: Pain in left shoulder [M25.512]    SUBJECTIVE  Pain Level (0-10 scale): 2-3  Any medication changes, allergies to medications, adverse drug reactions, diagnosis change, or new procedure performed?: [x] No    [] Yes (see summary sheet for update)  Subjective functional status/changes:   [] No changes reported  The pt reports he is doing okay but is not progressing as fast as he wants. OBJECTIVE       20 min Therapeutic Exercise:  [x]? See flow sheet :   Rationale: increase ROM and increase strength to improve the patients ability to perform functional tasks.         30 min Manual Therapy:  GHJ grade 2 mobs inf and posterior, PROM with gentle distraction with flexion and horizontal add, PROM IR and ER in scapular plane. Graston to biceps   Rationale: decrease pain, increase ROM, increase tissue extensibility and decrease trigger points to improve ability for daily activities.                With   [] TE   [] TA   [] neuro   [] other: Patient Education: [x] Review HEP    [] Progressed/Changed HEP based on:   [] positioning   [] body mechanics   [] transfers   [] heat/ice application    [] other:      Other Objective/Functional Measures:  Golf swing with swing adjustment post manual without pain increase     Pain Level (0-10 scale) post treatment: 1    ASSESSMENT/Changes in Function:  The pt with improved mobility post treatment with diminished pain. He will benefit from continued manual focus with gradual strengthening.      Patient will continue to benefit from skilled PT services to modify and progress therapeutic interventions, address functional mobility deficits, address ROM deficits, address strength deficits, analyze and address soft tissue restrictions, analyze and cue movement patterns and assess and modify postural abnormalities to attain remaining goals. []  See Plan of Care  []  See progress note/recertification  []  See Discharge Summary         Progress towards goals / Updated goals:  Goals for this certification period to be accomplished in 4 weeks:               1. Improve FOTO to 72 to improve ability for daily tasks.                  PN: 63 on 5-19-20                 2. Pt will report at least 75% improvement of the left shoulder to improve ability for daily tasks.             PN: progressing 50% on  5-19-20                 3. Pt will report no difficulty with reaching a shelf overhead.   Jerod Ebony[de-identified] progressing a little difficulty on 5-19-20              Current: a little difficulty per pt 5-27-20                  4.  The pt will demonstrate 4+/5 left shoulder scaption strength to improve ability for daily tasks.                PN: 4/5    Current: 4/5 to 4+/5 on 6-3-20    PLAN  []  Upgrade activities as tolerated     [x]  Continue plan of care  []  Update interventions per flow sheet       []  Discharge due to:_  []  Other:_      Reshma Claire, PT 6/3/2020  10:40 AM    Future Appointments   Date Time Provider Juarez Loco   6/10/2020 10:30 AM Kody Webb, PT [facility]

## 2020-06-10 ENCOUNTER — HOSPITAL ENCOUNTER (OUTPATIENT)
Dept: PHYSICAL THERAPY | Age: 80
Discharge: HOME | End: 2020-06-10
Payer: MEDICARE

## 2020-06-10 PROCEDURE — 97140 MANUAL THERAPY 1/> REGIONS: CPT

## 2020-06-10 PROCEDURE — 97110 THERAPEUTIC EXERCISES: CPT

## 2020-06-10 NOTE — PROGRESS NOTES
PT DAILY TREATMENT NOTE 10-18    Patient Name: Jalen Baig  Date:6/10/2020  : 1940  [x]  Patient  Verified  Payor: VA MEDICARE / Plan: VA MEDICARE PART A & B / Product Type: Medicare /    In [facility]:7565 Out time:10:20  Total Treatment Time (min): 52  Visit #: 3 of 4-8    Medicare/BCBS Only   Total Timed Codes (min):  49 1:1 Treatment Time:  45       Treatment Area: Pain in left shoulder [M25.512]    SUBJECTIVE  Pain Level (0-10 scale): 2-3  Any medication changes, allergies to medications, adverse drug reactions, diagnosis change, or new procedure performed?: [x] No    [] Yes (see summary sheet for update)  Subjective functional status/changes:   [] No changes reported  The pt reports he feels he is on the right track. OBJECTIVE       19 min Therapeutic Exercise:  [x]? See flow sheet :   Rationale: increase ROM and increase strength to improve the patients ability to perform functional tasks.         30 min Manual Therapy:  GHJ grade 2 mobs inf and posterior, PROM with gentle distraction with flexion and horizontal add, PROM IR and ER in scapular plane. Graston to biceps   Rationale: decrease pain, increase ROM, increase tissue extensibility and decrease trigger points to improve ability for daily activities.                With   [] TE   [] TA   [] neuro   [] other: Patient Education: [x] Review HEP    [] Progressed/Changed HEP based on:   [] positioning   [] body mechanics   [] transfers   [] heat/ice application    [] other:      Other Objective/Functional Measures:  Golf swing with swing adjustment post manual without pain increase     Pain Level (0-10 scale) post treatment: 1    ASSESSMENT/Changes in Function:  The pt is progressing with PT with improved ROM and diminished reported overall pain.      Patient will continue to benefit from skilled PT services to modify and progress therapeutic interventions, address functional mobility deficits, address ROM deficits, address strength deficits, analyze and address soft tissue restrictions, analyze and cue movement patterns and assess and modify postural abnormalities to attain remaining goals. []  See Plan of Care  []  See progress note/recertification  []  See Discharge Summary         Progress towards goals / Updated goals:  Goals for this certification period to be accomplished in 4 weeks:               1. Improve FOTO to 72 to improve ability for daily tasks.                  PN: 63 on 5-19-20                 2. Pt will report at least 75% improvement of the left shoulder to improve ability for daily tasks.             PN: progressing 50% on  5-19-20                 3. Pt will report no difficulty with reaching a shelf overhead.   Roseanne Frankie[de-identified] progressing a little difficulty on 5-19-20              Current: slight difficulty per pt 6-9-20                  4.  The pt will demonstrate 4+/5 left shoulder scaption strength to improve ability for daily tasks.                PN: 4/5    Current: 4/5 to 4+/5 on 6-3-20    PLAN  []  Upgrade activities as tolerated     [x]  Continue plan of care  []  Update interventions per flow sheet       []  Discharge due to:_  []  Other:_      Stan Leyden, PT 6/10/2020  10:40 AM    Future Appointments   Date Time Provider Juarez Loco   6/16/2020  1:30 PM Jacque Subramanian, PT [facility]   6/18/2020 11:15 AM Jacque Subramanian, PT [facility]   6/23/2020 10:30 AM Jacque Subramanian, PT [facility]   6/25/2020 11:15 AM Jacque Subramanian, PT [facility]   6/30/2020 10:30 AM Jacque Subramanian, PT [facility]   7/2/2020 10:30 AM Jacque Subramanian, PT [facility]

## 2020-06-16 ENCOUNTER — HOSPITAL ENCOUNTER (OUTPATIENT)
Dept: PHYSICAL THERAPY | Age: 80
Discharge: HOME | End: 2020-06-16
Payer: MEDICARE

## 2020-06-16 PROCEDURE — 97140 MANUAL THERAPY 1/> REGIONS: CPT

## 2020-06-16 PROCEDURE — 97110 THERAPEUTIC EXERCISES: CPT

## 2020-06-16 NOTE — PROGRESS NOTES
PT DAILY TREATMENT NOTE 10-18    Patient Name: Stephanie Pizano  Date:2020  : 1940  [x]  Patient  Verified  Payor: VA MEDICARE / Plan: VA MEDICARE PART A & B / Product Type: Medicare /    In time:1:30  Out time:2:21  Total Treatment Time (min): 51  Visit #: 3 of 4-8    Medicare/BCBS Only   Total Timed Codes (min):  51 1:1 Treatment Time:  45       Treatment Area: Pain in left shoulder [M25.512]    SUBJECTIVE  Pain Level (0-10 scale): 0  Any medication changes, allergies to medications, adverse drug reactions, diagnosis change, or new procedure performed?: [x] No    [] Yes (see summary sheet for update)  Subjective functional status/changes:   [] No changes reported  No pain at rest reported. The pt reports 80% improvement with PT.     OBJECTIVE      26 min Therapeutic Exercise:  [x]? ? See flow sheet :   Rationale: increase ROM and increase strength to improve the patients ability to perform functional tasks.         25 min Manual Therapy:  GHJ grade 2 mobs inf and posterior, PROM with gentle distraction with flexion and horizontal add, PROM IR and ER in scapular plane. Graston to biceps   Rationale: decrease pain, increase ROM, increase tissue extensibility and decrease trigger points to improve ability for daily activities.                   With   [] TE   [] TA   [] neuro   [] other: Patient Education: [x] Review HEP    [] Progressed/Changed HEP based on:   [] positioning   [] body mechanics   [] transfers   [] heat/ice application    [] other:      Other Objective/Functional Measures: added therex per flow sheet     Pain Level (0-10 scale) post treatment: 0    ASSESSMENT/Changes in Function:  The pt is progressing well with PT noting 80% improvement since start of care. AROM and strength are improving.      Patient will continue to benefit from skilled PT services to modify and progress therapeutic interventions, address functional mobility deficits, address ROM deficits, address strength deficits, analyze and address soft tissue restrictions, analyze and cue movement patterns and analyze and modify body mechanics/ergonomics to attain remaining goals. []  See Plan of Care  []  See progress note/recertification  []  See Discharge Summary         Progress towards goals / Updated goals:  Goals for this certification period to be accomplished in 4 weeks:               1. Improve FOTO to 72 to improve ability for daily tasks.                  PN: 63 on 5-19-20                 2. Pt will report at least 75% improvement of the left shoulder to improve ability for daily tasks.             PN: progressing 50% on  5-19-20   Current: MET 80% improved on 6-16-20                 3. Pt will report no difficulty with reaching a shelf overhead.   Drew Walkermitt[de-identified] progressing a little difficulty on 5-19-20              Current: slight difficulty per pt 6-9-20                  4.  The pt will demonstrate 4+/5 left shoulder scaption strength to improve ability for daily tasks.                PN: 4/5               Current: 4/5 to 4+/5 on 6-3-20    PLAN  []  Upgrade activities as tolerated     [x]  Continue plan of care  []  Update interventions per flow sheet       []  Discharge due to:_  []  Other:_      Duane Barros, PT 6/16/2020  1:39 PM    Future Appointments   Date Time Provider Juarez Loco   6/18/2020 11:15 AM Abel Mura, PT [facility]   6/23/2020 10:30 AM [place] Mura, PT [facility]   6/25/2020 11:15 AM Abel Mura, PT [facility]   6/30/2020 10:30 AM [place] Mura, PT [facility]   7/2/2020 10:30 AM [place] Mura, PT [facility]

## 2020-06-18 ENCOUNTER — HOSPITAL ENCOUNTER (OUTPATIENT)
Dept: PHYSICAL THERAPY | Age: 80
Discharge: HOME | End: 2020-06-18
Payer: MEDICARE

## 2020-06-18 PROCEDURE — 97110 THERAPEUTIC EXERCISES: CPT

## 2020-06-18 PROCEDURE — 97140 MANUAL THERAPY 1/> REGIONS: CPT

## 2020-06-18 NOTE — PROGRESS NOTES
PT DAILY TREATMENT NOTE 10-18    Patient Name: Wallace Tucker  Date:2020  : 1940  [x]  Patient  Verified  Payor: VA MEDICARE / Plan: VA MEDICARE PART A & B / Product Type: Medicare /    In time:11:14  Out time:12:05  Total Treatment Time (min): 51  Visit #: 4 of 4-8    Medicare/BCBS Only   Total Timed Codes (min):  51 1:1 Treatment Time:  51       Treatment Area: Pain in left shoulder [M25.512]    SUBJECTIVE  Pain Level (0-10 scale): 1-2  Any medication changes, allergies to medications, adverse drug reactions, diagnosis change, or new procedure performed?: [x] No    [] Yes (see summary sheet for update)  Subjective functional status/changes:   [] No changes reported  The pt reports still some pain but seems to still be improving with PT.     OBJECTIVE    Modality rationale: *   Min Type Additional Details    [] Estim:  []Unatt       []IFC  []Premod                        []Other:  []w/ice   []w/heat  Position:  Location:    [] Estim: []Att    []TENS instruct  []NMES                    []Other:  []w/US   []w/ice   []w/heat  Position:  Location:    []  Traction: [] Cervical       []Lumbar                       [] Prone          []Supine                       []Intermittent   []Continuous Lbs:  [] before manual  [] after manual    []  Ultrasound: []Continuous   [] Pulsed                           []1MHz   []3MHz W/cm2:  Location:    []  Iontophoresis with dexamethasone         Location: [] Take home patch   [] In clinic    []  Ice     []  heat  []  Ice massage  []  Laser   []  Anodyne Position:  Location:    []  Laser with stim  []  Other:  Position:  Location:    []  Vasopneumatic Device Pressure:       [] lo [] med [] hi   Temperature: [] lo [] med [] hi   [] Skin assessment post-treatment:  []intact []redness- no adverse reaction    []redness  adverse reaction:     31 min Therapeutic Exercise:  [x]? ?? See flow sheet :   Rationale: increase ROM and increase strength to improve the patients ability to perform functional tasks.         20 min Manual Therapy:  GHJ grade 2 mobs inf and posterior, PROM with gentle distraction with flexion and horizontal add, PROM IR and ER in scapular plane. Graston to biceps   Rationale: decrease pain, increase ROM, increase tissue extensibility and decrease trigger points to improve ability for daily activities.             With   [] TE   [] TA   [] neuro   [] other: Patient Education: [x] Review HEP    [] Progressed/Changed HEP based on:   [] positioning   [] body mechanics   [] transfers   [] heat/ice application    [] other:      Other Objective/Functional Measures:       Pain Level (0-10 scale) post treatment: 1    ASSESSMENT/Changes in Function:  The pt demonstrates improved overall pain and reports improved ability for performing his daily activities. He is still progressing with PT and will benefit from continued treatment. Patient will continue to benefit from skilled PT services to modify and progress therapeutic interventions, address functional mobility deficits, address ROM deficits, address strength deficits and analyze and address soft tissue restrictions to attain remaining goals. []  See Plan of Care  []  See progress note/recertification  []  See Discharge Summary         Progress towards goals / Updated goals:  Goals for this certification period to be accomplished in 4 weeks:               1. Improve FOTO to 72 to improve ability for daily tasks.                  PN: 63 on 5-19-20                 2. Pt will report at least 75% improvement of the left shoulder to improve ability for daily tasks.             PN: progressing 50% on  5-19-20              Current: MET 80% improved on 6-16-20                 3. Pt will report no difficulty with reaching a shelf overhead.   Karine Miners[de-identified] progressing a little difficulty on 5-19-20              Current: slight difficulty per pt 6-18-20                  4.  The pt will demonstrate 4+/5 left shoulder scaption strength to improve ability for daily tasks.                PN: 4/5               Current: 4/5 to 4+/5 on 6-8-20    PLAN  []  Upgrade activities as tolerated     [x]  Continue plan of care  []  Update interventions per flow sheet       []  Discharge due to:_  []  Other:_      Duane Barros, PT 6/18/2020  11:14 AM    Future Appointments   Date Time Provider Juarez Loco   6/18/2020 11:15 AM [place] Mura, PT [facility]   6/23/2020 10:30 AM Abel Mura, PT [facility]   6/25/2020 11:15 AM [place] Mura, PT [facility]   6/30/2020 10:30 AM [place] Mura, PT [facility]   7/2/2020 10:30 AM Abel Mura, PT [facility]

## 2020-06-18 NOTE — PROGRESS NOTES
[facility]  [address]  [place], [address]  [phone] fax    Continued Plan of Care/ Re-certification for Physical Therapy Services    Patient name: Eagle Cummins Start of Care: 2020   Referral source: David Dempsey DO : 1940               Medical Diagnosis: Pain in left shoulder [M25.512]  Payor: VA MEDICARE / Plan: VA MEDICARE PART A & B / Product Type: Medicare /  Onset Date: 2020               Treatment Diagnosis: left shoulder pain    Prior Hospitalization: see medical history Provider#: [id number]   Medications: Verified on Patient summary List    Comorbidities: cancer, LBP, heart disease, HTN   Prior Level of Function: functionally I with daily tasks    Visits from Start of Care: 10    Missed Visits: 0    The Plan of Care and following information is based on the patient's current status:      Progress towards goals                1. Improve FOTO to 72 to improve ability for daily tasks.                  PN: 63 on 20                 2. Pt will report at least 75% improvement of the left shoulder to improve ability for daily tasks.             PN: progressing 50% on  20              Current: MET 80% improved on 20                 3. Pt will report no difficulty with reaching a shelf overhead.   Link Kendra[de-identified] progressing a little difficulty on 20              Current: slight difficulty per pt 20                  4. The pt will demonstrate 4+/5 left shoulder scaption strength to improve ability for daily tasks.                PN: 4/5               Current: 4/5 to 4+/5 on 20        Key functional changes: The pt demonstrates improved overall pain and reports improved ability for performing his daily activities. He is still progressing with PT and will benefit from continued treatment.          Problems/ barriers to goal attainment: none     Problem List: pain affecting function, decrease ROM, decrease strength, decrease ADL/ functional abilitiies, decrease activity tolerance and decrease flexibility/ joint mobility    Treatment Plan: Therapeutic exercise, Therapeutic activities, Neuromuscular re-education, Physical agent/modality, Manual therapy, Patient education and Self Care training     Patient Goal (s) has been updated and includes: \"To continue to improve my strength and motion\"     Goals for this certification period to be accomplished in 4 weeks:    1. Improve FOTO to 72 to improve ability for daily tasks.                  PN: 63                  2. Pt will report no difficulty with reaching a shelf overhead.             PN: slight difficulty                  4. The pt will demonstrate 4+/5 left shoulder scaption strength to improve ability for daily tasks.                PN:  4/5 to 4+/5     Frequency / Duration: Patient to be seen 2 times per week for 4 weeks:    Assessment / Morna Brain will continue to benefit from skilled PT services to modify and progress therapeutic interventions, address functional mobility deficits, address ROM deficits, address strength deficits and analyze and address soft tissue restrictions to attain remaining goals. Certification Period: 06-18-20 to 07-    Duane Barros, PT 6/18/2020 11:23 AM    ________________________________________________________________________  I certify that the above Therapy Services are being furnished while the patient is under my care. I agree with the treatment plan and certify that this therapy is necessary. [] I have read the above and request that my patient continue as recommended.   [] I have read the above report and request that my patient continue therapy with the following changes/special instructions: _____________________________________________  [] I have read the above report and request that my patient be discharged from therapy    Physician's Signature:____________Date:_________TIME:________    [employer], Date and Time must be completed for valid certification **    Please sign and return to [facility] [address]  [address]  [place], [address]  [phone] fax

## 2020-06-23 ENCOUNTER — HOSPITAL ENCOUNTER (OUTPATIENT)
Dept: PHYSICAL THERAPY | Age: 80
Discharge: HOME | End: 2020-06-23
Payer: MEDICARE

## 2020-06-23 PROCEDURE — 97140 MANUAL THERAPY 1/> REGIONS: CPT

## 2020-06-23 PROCEDURE — 97110 THERAPEUTIC EXERCISES: CPT

## 2020-06-23 NOTE — PROGRESS NOTES
PT DAILY TREATMENT NOTE 10-18    Patient Name: Stephanie Pizano  Date:2020  : 1940  [x]  Patient  Verified  Payor: VA MEDICARE / Plan: VA MEDICARE PART A & B / Product Type: Medicare /    In time:1030  Out time:1120  Total Treatment Time (min): 50  Visit #: 1 of 8    Medicare/BCBS Only   Total Timed Codes (min):  50 1:1 Treatment Time:  45       Treatment Area: Pain in left shoulder [M25.512]    SUBJECTIVE  Pain Level (0-10 scale): 1  Any medication changes, allergies to medications, adverse drug reactions, diagnosis change, or new procedure performed?: [x] No    [] Yes (see summary sheet for update)  Subjective functional status/changes:   [] No changes reported  The pt reports just a little pain this morning. OBJECTIVE    Modality rationale:    Min Type Additional Details    [] Estim:  []Unatt       []IFC  []Premod                        []Other:  []w/ice   []w/heat  Position:  Location:    [] Estim: []Att    []TENS instruct  []NMES                    []Other:  []w/US   []w/ice   []w/heat  Position:  Location:    []  Traction: [] Cervical       []Lumbar                       [] Prone          []Supine                       []Intermittent   []Continuous Lbs:  [] before manual  [] after manual    []  Ultrasound: []Continuous   [] Pulsed                           []1MHz   []3MHz W/cm2:  Location:    []  Iontophoresis with dexamethasone         Location: [] Take home patch   [] In clinic    []  Ice     []  heat  []  Ice massage  []  Laser   []  Anodyne Position:  Location:    []  Laser with stim  []  Other:  Position:  Location:    []  Vasopneumatic Device Pressure:       [] lo [] med [] hi   Temperature: [] lo [] med [] hi   [] Skin assessment post-treatment:  []intact []redness- no adverse reaction    []redness  adverse reaction:        30 min Therapeutic Exercise:  [x]? ??? See flow sheet :   Rationale: increase ROM and increase strength to improve the patients ability to perform functional tasks.         20 min Manual Therapy:  GHJ grade 2 mobs inf and posterior, PROM with gentle distraction with flexion and horizontal add, PROM IR and ER in scapular plane. Graston to biceps   Rationale: decrease pain, increase ROM, increase tissue extensibility and decrease trigger points to improve ability for daily activities.                 With   [] TE   [] TA   [] neuro   [] other: Patient Education: [x] Review HEP    [] Progressed/Changed HEP based on:   [] positioning   [] body mechanics   [] transfers   [] heat/ice application    [] other:      Other Objective/Functional Measures: added 1/2 foam exercises per flow sheet     Pain Level (0-10 scale) post treatment: 0    ASSESSMENT/Changes in Function:  No pain noted post treatment, shoulder mobility is progressing well. Patient will continue to benefit from skilled PT services to modify and progress therapeutic interventions, address functional mobility deficits, address ROM deficits, address strength deficits, analyze and address soft tissue restrictions and analyze and cue movement patterns to attain remaining goals. []  See Plan of Care  []  See progress note/recertification  []  See Discharge Summary         Progress towards goals / Updated goals:  Goals for this certification period to be accomplished in 4 weeks:    1. Improve FOTO to 72 to improve ability for daily tasks.                  PN: 63                  2. Pt will report no difficulty with reaching a shelf overhead.             PN: slight difficulty                  4.  The pt will demonstrate 4+/5 left shoulder scaption strength to improve ability for daily tasks.                PN:  4/5 to 4+/5     PLAN  []  Upgrade activities as tolerated     [x]  Continue plan of care  []  Update interventions per flow sheet       []  Discharge due to:_  []  Other:_      Reshma Claire, PT 6/23/2020  10:31 AM    Future Appointments   Date Time Provider Juarez Loco   6/25/2020 11:15 AM Richard Romero Jamia Nicholas, PT [facility]   6/30/2020 10:30 AM Ly Montelongo, PT [facility]   7/2/2020 10:30 AM [facility]

## 2020-06-25 ENCOUNTER — HOSPITAL ENCOUNTER (OUTPATIENT)
Dept: PHYSICAL THERAPY | Age: 80
Discharge: HOME | End: 2020-06-25
Payer: MEDICARE

## 2020-06-25 PROCEDURE — 97140 MANUAL THERAPY 1/> REGIONS: CPT

## 2020-06-25 PROCEDURE — 97110 THERAPEUTIC EXERCISES: CPT

## 2020-06-25 NOTE — PROGRESS NOTES
PT DAILY TREATMENT NOTE 10-18    Patient Name: Bibi Matos  Date:2020  : 1940  [x]  Patient  Verified  Payor: [place] MED[place]ARE / Plan: [place] MEDICARE PART A & B / Product Type: Medicare /    In time:11:17  Out time:12:04  Total Treatment Time (min): 52  Visit #: 2 of 8    Medicare/BCBS Only   Total Timed Codes (min):  47 1:1 Treatment Time:  52       Treatment Area: Pain in left shoulder [M25.512]    SUBJECTIVE  Pain Level (0-10 scale): 0  Any medication changes, allergies to medications, adverse drug reactions, diagnosis change, or new procedure performed?: [x] No    [] Yes (see summary sheet for update)  Subjective functional status/changes:   [] No changes reported  The pt reports he is noticing less pain. He states he was able to hit a few golf balls with minimal pain. OBJECTIVE    Modality rationale:    Min Type Additional Details    [] Estim:  []Unatt       []IFC  []Premod                        []Other:  []w/ice   []w/heat  Position:  Location:    [] Estim: []Att    []TENS instruct  []NMES                    []Other:  []w/US   []w/ice   []w/heat  Position:  Location:    []  Traction: [] Cervical       []Lumbar                       [] Prone          []Supine                       []Intermittent   []Continuous Lbs:  [] before manual  [] after manual    []  Ultrasound: []Continuous   [] Pulsed                           []1MHz   []3MHz W/cm2:  Location:    []  Iontophoresis with dexamethasone         Location: [] Take home patch   [] In clinic    []  Ice     []  heat  []  Ice massage  []  Laser   []  Anodyne Position:  Location:    []  Laser with stim  []  Other:  Position:  Location:    []  Vasopneumatic Device Pressure:       [] lo [] med [] hi   Temperature: [] lo [] med [] hi   [] Skin assessment post-treatment:  []intact []redness- no adverse reaction    []redness  adverse reaction:         30 min Therapeutic Exercise:  [x]? ???? See flow sheet :   Rationale: increase ROM and increase strength to improve the patients ability to perform functional tasks.         17 min Manual Therapy:  GHJ grade 2 mobs inf and posterior, PROM with gentle distraction with flexion and horizontal add, PROM IR and ER in scapular plane. Graston to biceps   Rationale: decrease pain, increase ROM, increase tissue extensibility and decrease trigger points to improve ability for daily activities.             With   [] TE   [] TA   [] neuro   [] other: Patient Education: [x] Review HEP    [] Progressed/Changed HEP based on:   [] positioning   [] body mechanics   [] transfers   [] heat/ice application    [] other:      Other Objective/Functional Measures:  increased resistance with tband     Pain Level (0-10 scale) post treatment: 0    ASSESSMENT/Changes in Function:  Pt is progressing well and is starting to return to full activities. Patient will continue to benefit from skilled PT services to modify and progress therapeutic interventions, address functional mobility deficits, address ROM deficits, address strength deficits, analyze and address soft tissue restrictions and analyze and cue movement patterns to attain remaining goals. []  See Plan of Care  []  See progress note/recertification  []  See Discharge Summary         Progress towards goals / Updated goals:  Goals for this certification period to be accomplished in 4 weeks:    1. Improve FOTO to 72 to improve ability for daily tasks.                  PN: 63                  2. Pt will report no difficulty with reaching a shelf overhead.             PN: slight difficulty   Current: slight difficulty on 6-25-20                  4.  The pt will demonstrate 4+/5 left shoulder scaption strength to improve ability for daily tasks.                PN:  4/5 to 4+/5    Current: nearly met 6-25-20    PLAN  []  Upgrade activities as tolerated     [x]  Continue plan of care  []  Update interventions per flow sheet       []  Discharge due to:_  []  Other:Coni Pizano Ray Vera, PT 6/25/2020  11:30 AM    Future Appointments   Date Time Provider Juarez Loco   6/30/2020 10:30 AM Branden Mann, PT [id number]   7/2/2020 10:30 AM Branden Mann PT [id number]

## 2020-06-30 ENCOUNTER — HOSPITAL ENCOUNTER (OUTPATIENT)
Dept: PHYSICAL THERAPY | Age: 80
Discharge: HOME | End: 2020-06-30
Payer: MEDICARE

## 2020-06-30 PROCEDURE — 97110 THERAPEUTIC EXERCISES: CPT

## 2020-06-30 PROCEDURE — 97140 MANUAL THERAPY 1/> REGIONS: CPT

## 2020-06-30 NOTE — PROGRESS NOTES
PT DAILY TREATMENT NOTE 10-18    Patient Name: Nimesh Selby  Date:2020  : 1940  [x]  Patient  Verified  Payor: [place] MEDICARE / Plan: [place] MEDICARE PART A & B / Product Type: Medicare /    In time:1030  Out time:1110  Total Treatment Time (min): 40  Visit #: 3 of 8    Medicare/BCBS Only   Total Timed Codes (min):  40 1:1 Treatment Time:  40       Treatment Area: Pain in left shoulder [M25.512]    SUBJECTIVE  Pain Level (0-10 scale): 1  Any medication changes, allergies to medications, adverse drug reactions, diagnosis change, or new procedure performed?: [x] No    [] Yes (see summary sheet for update)  Subjective functional status/changes:   [] No changes reported  The pt reports just a little pain right now. OBJECTIVE    Modality rationale:    Min Type Additional Details    [] Estim:  []Unatt       []IFC  []Premod                        []Other:  []w/ice   []w/heat  Position:  Location:    [] Estim: []Att    []TENS instruct  []NMES                    []Other:  []w/US   []w/ice   []w/heat  Position:  Location:    []  Traction: [] Cervical       []Lumbar                       [] Prone          []Supine                       []Intermittent   []Continuous Lbs:  [] before manual  [] after manual    []  Ultrasound: []Continuous   [] Pulsed                           []1MHz   []3MHz W/cm2:  Location:    []  Iontophoresis with dexamethasone         Location: [] Take home patch   [] In clinic    []  Ice     []  heat  []  Ice massage  []  Laser   []  Anodyne Position:  Location:    []  Laser with stim  []  Other:  Position:  Location:    []  Vasopneumatic Device Pressure:       [] lo [] med [] hi   Temperature: [] lo [] med [] hi   [] Skin assessment post-treatment:  []intact []redness- no adverse reaction    []redness  adverse reaction:       25 min Therapeutic Exercise:  [x]?????? See flow sheet :   Rationale: increase ROM and increase strength to improve the patients ability to perform functional tasks.       15 min Manual Therapy:  GHJ grade 2-3 mobs inf and posterior, PROM with gentle distraction with flexion and horizontal add, PROM IR and ER in scapular plane. Graston to biceps   Rationale: decrease pain, increase ROM, increase tissue extensibility and decrease trigger points to improve ability for daily activities.             With   [] TE   [] TA   [] neuro   [] other: Patient Education: [x] Review HEP    [] Progressed/Changed HEP based on:   [] positioning   [] body mechanics   [] transfers   [] heat/ice application    [] other:      Other Objective/Functional Measures: added 2# with full cans     Pain Level (0-10 scale) post treatment: 0    ASSESSMENT/Changes in Function:  The pt is progressing well with PT. Decreased overall pain and improved ROM and strength. Possible discharge next visit or 2. Patient will continue to benefit from skilled PT services to modify and progress therapeutic interventions, address functional mobility deficits, address ROM deficits, address strength deficits, analyze and address soft tissue restrictions and analyze and cue movement patterns to attain remaining goals. []  See Plan of Care  []  See progress note/recertification  []  See Discharge Summary         Progress towards goals / Updated goals:  Goals for this certification period to be accomplished in 4 weeks:    1. Improve FOTO to 72 to improve ability for daily tasks.                  PN: 63                  2. Pt will report no difficulty with reaching a shelf overhead.             PN: slight difficulty              Current: slight difficulty on 6-25-20                  4.  The pt will demonstrate 4+/5 left shoulder scaption strength to improve ability for daily tasks.                PN:  4/5 to 4+/5               Current: nearly met 6-25-20    PLAN  []  Upgrade activities as tolerated     [x]  Continue plan of care  []  Update interventions per flow sheet       []  Discharge due to:_  []  Other:_      Raymond Castro Thomas, PT 6/30/2020  10:47 AM    Future Appointments   Date Time Provider Juarez Loco   7/2/2020 10:30 AM [facility]

## 2020-07-02 ENCOUNTER — HOSPITAL ENCOUNTER (OUTPATIENT)
Dept: PHYSICAL THERAPY | Age: 80
Discharge: HOME | End: 2020-07-02
Payer: MEDICARE

## 2020-07-02 PROCEDURE — 97110 THERAPEUTIC EXERCISES: CPT

## 2020-07-02 PROCEDURE — 97140 MANUAL THERAPY 1/> REGIONS: CPT

## 2020-07-02 NOTE — PROGRESS NOTES
PT DISCHARGE DAILY NOTE AND [id number]    Patient name: Eagle Cummins Start of Care: 2020   Referral source: Randa Dempsey DO : 1940               Medical Diagnosis: Pain in left shoulder [M25.512]  Payor: [place] MEDICARE / Plan: [place] MEDICARE PART A & B / Product Type: Medicare /  Onset Date: 2020               Treatment Diagnosis: left shoulder pain    Prior Hospitalization: see medical history Provider#: [id number]   Medications: Verified on Patient summary List    Comorbidities: cancer, LBP, heart disease, HTN   Prior Level of Function: functionally I with daily tasks    Visits from Start of Care: 14    Missed Visits: 0    Reporting Period : 20 to 20    [x]  Patient  Verified  Payor: [place] MEDICARE / Plan: [place] MEDICARE PART A & B / Product Type: Medicare /    In time:1029  Out time:11:18  Total Treatment Time (min): 49  Visit #: 4 of 8    Medicare/BCBS Only   Total Timed Codes (min):  49 1:1 Treatment Time:  45       SUBJECTIVE  Pain Level (0-10 scale): 1  Any medication changes, allergies to medications, adverse drug reactions, diagnosis change, or new procedure performed?: [x] No    [] Yes (see summary sheet for update)  Subjective functional status/changes:   [] No changes reported  The pt reports much improvement with PT.      OBJECTIVE    Modality rationale:    Min Type Additional Details    [] Estim:  []Unatt       []IFC  []Premod                        []Other:  []w/ice   []w/heat  Position:  Location:    [] Estim: []Att    []TENS instruct  []NMES                    []Other:  []w/US   []w/ice   []w/heat  Position:  Location:    []  Traction: [] Cervical       []Lumbar                       [] Prone          []Supine                       []Intermittent   []Continuous Lbs:  [] before manual  [] after manual    []  Ultrasound: []Continuous   [] Pulsed                           []1MHz   []3MHz W/cm2:  Location:    []  Iontophoresis with dexamethasone         Location: [] Take home patch   [] In clinic    []  Ice     []  heat  []  Ice massage  []  Laser   []  Anodyne Position:  Location:    []  Laser with stim  []  Other:  Position:  Location:    []  Vasopneumatic Device Pressure:       [] lo [] med [] hi   Temperature: [] lo [] med [] hi   [] Skin assessment post-treatment:  []intact []redness- no adverse reaction    []redness  adverse reaction:       34 min Therapeutic Exercise:  [x]??????? See flow sheet :   Rationale: increase ROM and increase strength to improve the patients ability to perform functional tasks.         15 min Manual Therapy:  GHJ grade 2-3 mobs inf and posterior, PROM with gentle distraction with flexion and horizontal add, PROM IR and ER in scapular plane. Graston to biceps   Rationale: decrease pain, increase ROM, increase tissue extensibility and decrease trigger points to improve ability for daily activities.               With   [] TE   [] TA   [] neuro   [] other: Patient Education: [x] Review HEP    [] Progressed/Changed HEP based on:   [] positioning   [] body mechanics   [] transfers   [] heat/ice application    [] other:      Other Objective/Functional Measures:       Pain Level (0-10 scale) post treatment: 0    Summary of Care:  Progress towards goals:    1. Improve FOTO to 72 to improve ability for daily tasks.                  PN: 63    Current: MET goal                 2. Pt will report no difficulty with reaching a shelf overhead.             PN: slight difficulty              ODQFQXP: MET no difficulty                  4. The pt will demonstrate 4+/5 left shoulder scaption strength to improve ability for daily tasks.                PN:  4/5 to 4+/5               Current: MET 4+/5 for ER, otherwise 5/5    ASSESSMENT/Changes in Function: pt has progressed well with PT and met all goals.  He is now ready for discharge    Thank you for this referral!     PLAN  []Discontinue therapy: [x]Patient has reached or is progressing toward set goals      []Patient is non-compliant or has abdicated      []Due to lack of appreciable progress towards set [address], PT 7/2/2020  10:38 AM

## 2022-02-01 ENCOUNTER — TRANSCRIBE ORDER (OUTPATIENT)
Dept: SCHEDULING | Age: 82
End: 2022-02-01

## 2022-02-01 DIAGNOSIS — G30.1 ALZHEIMER'S DISEASE WITH LATE ONSET (HCC): Primary | ICD-10-CM

## 2022-02-01 DIAGNOSIS — F02.80 ALZHEIMER'S DISEASE WITH LATE ONSET (HCC): Primary | ICD-10-CM

## 2022-03-12 ENCOUNTER — HOSPITAL ENCOUNTER (OUTPATIENT)
Age: 82
Discharge: HOME | End: 2022-03-12
Attending: PSYCHIATRY & NEUROLOGY
Payer: MEDICARE

## 2022-03-12 DIAGNOSIS — F02.80 ALZHEIMER'S DISEASE WITH LATE ONSET (HCC): ICD-10-CM

## 2022-03-12 DIAGNOSIS — G30.1 ALZHEIMER'S DISEASE WITH LATE ONSET (HCC): ICD-10-CM

## 2022-03-12 PROCEDURE — 70551 MRI BRAIN STEM W/O DYE: CPT

## 2023-05-20 RX ORDER — LEVOTHYROXINE SODIUM 112 UG/1
112 TABLET ORAL
COMMUNITY

## 2023-05-20 RX ORDER — PANTOPRAZOLE SODIUM 40 MG/1
40 TABLET, DELAYED RELEASE ORAL DAILY
COMMUNITY

## 2023-05-20 RX ORDER — METOPROLOL SUCCINATE 25 MG/1
25 TABLET, EXTENDED RELEASE ORAL
COMMUNITY
Start: 2015-01-04

## 2023-05-20 RX ORDER — PANTOPRAZOLE SODIUM 20 MG/1
TABLET, DELAYED RELEASE ORAL
COMMUNITY

## 2023-05-20 RX ORDER — CETIRIZINE HYDROCHLORIDE 5 MG/1
5 TABLET ORAL DAILY
COMMUNITY
Start: 2015-02-21

## 2023-05-20 RX ORDER — FAMOTIDINE 20 MG/1
20 TABLET, FILM COATED ORAL 2 TIMES DAILY
COMMUNITY
Start: 2015-02-21

## 2023-05-20 RX ORDER — ATORVASTATIN CALCIUM 20 MG/1
20 TABLET, FILM COATED ORAL
COMMUNITY

## 2023-05-20 RX ORDER — DOXAZOSIN 2 MG/1
2 TABLET ORAL DAILY
COMMUNITY